# Patient Record
Sex: FEMALE | Race: OTHER | NOT HISPANIC OR LATINO | Employment: FULL TIME | URBAN - METROPOLITAN AREA
[De-identification: names, ages, dates, MRNs, and addresses within clinical notes are randomized per-mention and may not be internally consistent; named-entity substitution may affect disease eponyms.]

---

## 2017-07-24 DIAGNOSIS — M54.42 LUMBAGO WITH SCIATICA, LEFT SIDE: ICD-10-CM

## 2017-07-24 DIAGNOSIS — G56.00 CARPAL TUNNEL SYNDROME, UNSPECIFIED LATERALITY: ICD-10-CM

## 2017-07-24 DIAGNOSIS — R20.0 NUMBNESS: ICD-10-CM

## 2017-07-24 RX ORDER — GABAPENTIN 600 MG/1
TABLET ORAL
Qty: 90 TABLET | Refills: 2 | Status: SHIPPED | OUTPATIENT
Start: 2017-07-24 | End: 2018-04-04

## 2017-07-25 NOTE — TELEPHONE ENCOUNTER
Left a message for the patient to return my call to reschedule her appointment with another provider.

## 2017-08-04 ENCOUNTER — TELEPHONE (OUTPATIENT)
Dept: NEUROLOGY | Facility: CLINIC | Age: 57
End: 2017-08-04

## 2017-08-04 NOTE — TELEPHONE ENCOUNTER
----- Message from Maggie Layton sent at 7/28/2017  8:50 AM CDT -----  Contact: Self @ [phone]   Pt is returning call from Caro Duarte. Pls call.

## 2017-08-28 ENCOUNTER — OFFICE VISIT (OUTPATIENT)
Dept: NEUROLOGY | Facility: CLINIC | Age: 57
End: 2017-08-28
Payer: COMMERCIAL

## 2017-08-28 VITALS
SYSTOLIC BLOOD PRESSURE: 132 MMHG | BODY MASS INDEX: 41.99 KG/M2 | DIASTOLIC BLOOD PRESSURE: 89 MMHG | HEIGHT: 63 IN | WEIGHT: 237 LBS | HEART RATE: 92 BPM

## 2017-08-28 DIAGNOSIS — G47.33 OSA (OBSTRUCTIVE SLEEP APNEA): Primary | ICD-10-CM

## 2017-08-28 DIAGNOSIS — E55.9 VITAMIN D DEFICIENCY: ICD-10-CM

## 2017-08-28 PROCEDURE — 3075F SYST BP GE 130 - 139MM HG: CPT | Mod: S$GLB,,, | Performed by: PSYCHIATRY & NEUROLOGY

## 2017-08-28 PROCEDURE — 99999 PR PBB SHADOW E&M-EST. PATIENT-LVL III: CPT | Mod: PBBFAC,,, | Performed by: PSYCHIATRY & NEUROLOGY

## 2017-08-28 PROCEDURE — 3079F DIAST BP 80-89 MM HG: CPT | Mod: S$GLB,,, | Performed by: PSYCHIATRY & NEUROLOGY

## 2017-08-28 PROCEDURE — 3008F BODY MASS INDEX DOCD: CPT | Mod: S$GLB,,, | Performed by: PSYCHIATRY & NEUROLOGY

## 2017-08-28 PROCEDURE — 99213 OFFICE O/P EST LOW 20 MIN: CPT | Mod: S$GLB,,, | Performed by: PSYCHIATRY & NEUROLOGY

## 2017-08-28 RX ORDER — VIT C/E/ZN/COPPR/LUTEIN/ZEAXAN 250MG-90MG
1000 CAPSULE ORAL DAILY
Qty: 90 CAPSULE | Refills: 3 | COMMUNITY
Start: 2017-08-28 | End: 2018-04-04

## 2017-08-28 RX ORDER — ZOLPIDEM TARTRATE 10 MG/1
10 TABLET ORAL
Refills: 3 | COMMUNITY
Start: 2017-07-24 | End: 2018-10-01 | Stop reason: SDUPTHER

## 2017-08-28 NOTE — PROGRESS NOTES
[facility] - NEUROLOGY  [facility] [place]    Date: August 28, 2017   Patient Name: Lulú Rosenthal   MRN: [id number]   PCP: Ran Interiano  Referring Provider: Ran Interiano MD    Assessment:      This is Lulú Rosenthal, 56 y.o. female with a history of craniotomy in the 1980's with intermittent paresthesias with preserved strength and no signs of myelopathy on exam.  She was advised to continue B-complex vitamin and begin supplementation with vitamin D.  She has symptoms concerning for sleep apnea which may related to some of her non-specific complaints.     Plan:      -  PSG  -  Vitamin supplemenation as above  -  Continue GBP  -  Follow up 3 months       I discussed side effects of the medications. I asked the patient to  stop the medication if she notices serious adverse effects as we discussed and to seek immediate medical attention at an ER.     Edgar Mattson MD  [facility]   Department of Neurology    Subjective:      HPI:   Ms. Lulú Rosenthal is a 56 y.o. female who presents with a chief complaint of paresthesias    She was initially evaluated for this issue by Dr. Hannon 11/2016, she began having intermittent left arm and leg weakness earlier that year.  She has been noted to have low vitamin B1 and D levels and briefly supplemented with B-complex but felt she had too much energy with this and has since stopped.  She continues to have intermittent left arm and leg tingling not alleviated by GBP, occasional difficulty with dexterity such as buttons.  Past several months this has begun to happen on the right as well.    She notes significant trouble maintaining sleep, daytime somnolence, and snoring.    PAST MEDICAL HISTORY:  Past Medical History:   Diagnosis Date    CTS (carpal tunnel syndrome)     Hypertension        PAST SURGICAL HISTORY:  Past Surgical History:   Procedure Laterality Date    CEREBRAL ANEURYSM REPAIR      TUBAL LIGATION    "      CURRENT MEDS:  Current Outpatient Prescriptions   Medication Sig Dispense Refill    cetirizine (ZYRTEC) 10 MG tablet 10 mg.  5    gabapentin (NEURONTIN) 600 MG tablet TAKE 1/2 TABLET BY MOUTH EVERY 6 TO 8 HOURS AS NEEDED. MAY INCREASE TO 1 WHOLE TABLET AS NEEDED. 90 tablet 2    hydrochlorothiazide (HYDRODIURIL) 25 MG tablet Take 25 mg by mouth once daily.      loratadine (CLARITIN) 10 mg tablet Take 10 mg by mouth once daily.      montelukast (SINGULAIR) 10 mg tablet Take 10 mg by mouth every evening.  3    naproxen (NAPROSYN) 500 MG tablet Take 1 tablet (500 mg total) by mouth 2 (two) times daily with meals. 30 tablet 0    zolpidem (AMBIEN) 10 mg Tab 10 mg.  3     No current facility-administered medications for this visit.        ALLERGIES:  Review of patient's allergies indicates:  No Known Allergies    FAMILY HISTORY:  Family History   Problem Relation Age of Onset    Heart disease Mother     Hypertension Mother     Diabetes Father     Heart disease Father     Psoriasis Neg Hx     Eczema Neg Hx        SOCIAL HISTORY:  Social History   Substance Use Topics    Smoking status: Former Smoker     Packs/day: 0.80     Types: Cigarettes     Quit date: 3/6/2016    Smokeless tobacco: Never Used    Alcohol use 1.8 oz/week     3 Glasses of wine per week      Comment: occasionally       Review of Systems:  12 review of systems is negative except for the symptoms mentioned in HPI.        Objective:     Vitals:    08/28/17 1302   BP: 132/89   Pulse: 92   Weight: 107.5 kg (236 lb 15.9 oz)   Height: 5' 3" (1.6 m)       General: NAD, well nourished   Eyes: no tearing, discharge, no erythema   ENT: moist mucous membranes of the oral cavity, nares patent    Neck: Supple, full range of motion  Cardiovascular: Warm and well perfused, pulses equal and symmetrical  Lungs: Normal work of breathing, normal chest wall excursions  Skin: No rash, lesions, or breakdown on exposed skin  Psychiatry: Mood and affect are " appropriate   Abdomen: soft, non tender, non distended  Extremeties: No cyanosis, clubbing or edema.    Neurological   MENTAL STATUS: Alert and oriented to person, place, and time. Attention and concentration within normal limits. Speech without dysarthria, able to name and repeat without difficulty. Recent and remote memory within normal limits   CRANIAL NERVES: Visual fields intact. PERRL. EOMI. Facial sensation intact. Face symmetrical. Hearing grossly intact. Full shoulder shrug bilaterally. Tongue protrudes midline   SENSORY: Sensation is intact to light touch throughout.  Negative Romberg.   MOTOR: Normal bulk and tone. No pronator drift.  5/5 deltoid, biceps, triceps, interosseous, hand  bilaterally. 5/5 iliopsoas, knee extension/flexion, foot dorsi/plantarflexion bilaterally.    REFLEXES: ankles mute, remainder symmetric and 2+ throughout.   CEREBELLAR/COORDINATION/GAIT: Gait steady with normal arm swing and stride length.  Heel to shin intact. Finger to nose intact. Normal rapid alternating movements.

## 2017-08-28 NOTE — LETTER
August 28, 2017      Ran Interiano MD  [address] [facility]  [address]  [facility] [address]           [facility]  [address]  [place] [address]  Phone: [phone]  Fax: [phone]          Patient: Lulú Rosenthal   MR Number: [id number]   YOB: 1960   Date of Visit: 8/28/2017       Dear Dr. Ran Interiano:    Thank you for referring Lulú Rosenthal to me for evaluation. Attached you will find relevant portions of my assessment and plan of care.    If you have questions, please do not hesitate to call me. I look forward to following Lulú Rosenthal along with you.    Sincerely,    Edgar Mattson MD    Enclosure  CC:  No Recipients    If you would like to receive this communication electronically, please contact [email or web address] or [phone] to request more information on [employer] Link access.    For providers and/or their staff who would like to refer a patient to [facility], please contact us through our one-stop-shop provider referral line, [facility], at [phone].    If you feel you have received this communication in error or would no longer like to receive these types of communications, please e-mail [email or web address]

## 2017-10-24 ENCOUNTER — HOSPITAL ENCOUNTER (OUTPATIENT)
Dept: RADIOLOGY | Facility: HOSPITAL | Age: 57
Discharge: HOME | End: 2017-10-24
Attending: INTERNAL MEDICINE
Payer: COMMERCIAL

## 2017-10-24 VITALS — HEIGHT: 63 IN | WEIGHT: 236 LBS | BODY MASS INDEX: 41.82 KG/M2

## 2017-10-24 PROCEDURE — 77067 SCR MAMMO BI INCL CAD: CPT | Mod: TC

## 2017-10-24 PROCEDURE — 77067 SCR MAMMO BI INCL CAD: CPT | Mod: 26,,, | Performed by: RADIOLOGY

## 2017-11-03 ENCOUNTER — TELEPHONE (OUTPATIENT)
Dept: SLEEP MEDICINE | Facility: CLINIC | Age: 57
End: 2017-11-03

## 2017-11-03 NOTE — TELEPHONE ENCOUNTER
I called the pt to offer a 8 am appt since she just missed her 7 am appt, she sounded angry and said that no one told her where to go.  She had driven to the main campus and now already at work.  She said she cannot take the later appt and will probably do it next year.    I advised pt sleep apnea is a serious illness if stay untreated, it will arise other issue in the body.    Pt agreed, but still wants to maybe do this next year...

## 2018-04-04 ENCOUNTER — OFFICE VISIT (OUTPATIENT)
Dept: FAMILY MEDICINE | Facility: CLINIC | Age: 58
End: 2018-04-04
Payer: COMMERCIAL

## 2018-04-04 ENCOUNTER — HOSPITAL ENCOUNTER (OUTPATIENT)
Dept: RADIOLOGY | Facility: HOSPITAL | Age: 58
Discharge: HOME | End: 2018-04-04
Attending: FAMILY MEDICINE
Payer: COMMERCIAL

## 2018-04-04 VITALS
WEIGHT: 233.69 LBS | TEMPERATURE: 98 F | OXYGEN SATURATION: 97 % | HEIGHT: 63 IN | SYSTOLIC BLOOD PRESSURE: 134 MMHG | BODY MASS INDEX: 41.41 KG/M2 | DIASTOLIC BLOOD PRESSURE: 78 MMHG | HEART RATE: 83 BPM | RESPIRATION RATE: 20 BRPM

## 2018-04-04 DIAGNOSIS — Z23 NEED FOR TETANUS BOOSTER: ICD-10-CM

## 2018-04-04 DIAGNOSIS — R20.0 LEFT ARM NUMBNESS: ICD-10-CM

## 2018-04-04 DIAGNOSIS — Z23 NEED FOR VACCINATION: ICD-10-CM

## 2018-04-04 DIAGNOSIS — G56.00 CARPAL TUNNEL SYNDROME, UNSPECIFIED LATERALITY: ICD-10-CM

## 2018-04-04 DIAGNOSIS — Z00.00 ROUTINE GENERAL MEDICAL EXAMINATION AT A HEALTH CARE FACILITY: Primary | ICD-10-CM

## 2018-04-04 DIAGNOSIS — M54.5 CHRONIC MIDLINE LOW BACK PAIN, WITH SCIATICA PRESENCE UNSPECIFIED: ICD-10-CM

## 2018-04-04 DIAGNOSIS — G89.29 CHRONIC MIDLINE LOW BACK PAIN, WITH SCIATICA PRESENCE UNSPECIFIED: ICD-10-CM

## 2018-04-04 DIAGNOSIS — Z11.59 NEED FOR HEPATITIS C SCREENING TEST: ICD-10-CM

## 2018-04-04 DIAGNOSIS — I10 HYPERTENSION, UNSPECIFIED TYPE: ICD-10-CM

## 2018-04-04 PROCEDURE — 72100 X-RAY EXAM L-S SPINE 2/3 VWS: CPT | Mod: TC,FY,PO

## 2018-04-04 PROCEDURE — 72100 X-RAY EXAM L-S SPINE 2/3 VWS: CPT | Mod: 26,,, | Performed by: RADIOLOGY

## 2018-04-04 PROCEDURE — 72040 X-RAY EXAM NECK SPINE 2-3 VW: CPT | Mod: TC,FY,PO

## 2018-04-04 PROCEDURE — 99999 PR PBB SHADOW E&M-EST. PATIENT-LVL V: CPT | Mod: PBBFAC,,, | Performed by: FAMILY MEDICINE

## 2018-04-04 PROCEDURE — 99396 PREV VISIT EST AGE 40-64: CPT | Mod: 25,S$GLB,, | Performed by: FAMILY MEDICINE

## 2018-04-04 PROCEDURE — 72040 X-RAY EXAM NECK SPINE 2-3 VW: CPT | Mod: 26,,, | Performed by: RADIOLOGY

## 2018-04-04 PROCEDURE — 90471 IMMUNIZATION ADMIN: CPT | Mod: 59,S$GLB,, | Performed by: FAMILY MEDICINE

## 2018-04-04 PROCEDURE — 90715 TDAP VACCINE 7 YRS/> IM: CPT | Mod: S$GLB,,, | Performed by: FAMILY MEDICINE

## 2018-04-04 PROCEDURE — 96372 THER/PROPH/DIAG INJ SC/IM: CPT | Mod: S$GLB,,, | Performed by: FAMILY MEDICINE

## 2018-04-04 RX ORDER — TIZANIDINE 4 MG/1
TABLET ORAL
COMMUNITY
Start: 2018-04-03 | End: 2018-04-04 | Stop reason: SDUPTHER

## 2018-04-04 RX ORDER — FLUTICASONE PROPIONATE 50 MCG
SPRAY, SUSPENSION (ML) NASAL
COMMUNITY
Start: 2018-02-16 | End: 2018-04-04

## 2018-04-04 RX ORDER — TRIAMCINOLONE ACETONIDE 40 MG/ML
40 INJECTION, SUSPENSION INTRA-ARTICULAR; INTRAMUSCULAR
Status: COMPLETED | OUTPATIENT
Start: 2018-04-04 | End: 2018-04-04

## 2018-04-04 RX ORDER — LOSARTAN POTASSIUM 50 MG/1
50 TABLET ORAL DAILY
Qty: 30 TABLET | Refills: 3 | Status: SHIPPED | OUTPATIENT
Start: 2018-04-04 | End: 2018-07-19

## 2018-04-04 RX ORDER — TIZANIDINE 4 MG/1
4 TABLET ORAL 2 TIMES DAILY PRN
Qty: 60 TABLET | Refills: 1 | Status: SHIPPED | OUTPATIENT
Start: 2018-04-04 | End: 2018-10-01 | Stop reason: SDUPTHER

## 2018-04-04 RX ADMIN — TRIAMCINOLONE ACETONIDE 40 MG: 40 INJECTION, SUSPENSION INTRA-ARTICULAR; INTRAMUSCULAR at 03:04

## 2018-04-04 NOTE — PROGRESS NOTES
Chief Complaint   Patient presents with    [facility]       HPI  Lulú Rosenthal is a 57 y.o. female with multiple medical diagnoses as listed in the medical history and problem list that presents to establish care.    Back pain - 2 year hx, lower back pain, exercising at time and myalgia started, improved for few months, intermittent returns, worse with walking up steps, flat shoes worsens. Pain radiates laterally, dec ROM.    Muscle cramps/spasms - intermittent, worse with HCTZ use.     HTN - overall doing well, although states cramping associated with HCTZ.      Pt is known to me and was last seen by me on Visit date not found.    PAST MEDICAL HISTORY:  Past Medical History:   Diagnosis Date    CTS (carpal tunnel syndrome)     Hypertension        PAST SURGICAL HISTORY:  Past Surgical History:   Procedure Laterality Date    BREAST BIOPSY Left 2013    Core bx, benign    CEREBRAL ANEURYSM REPAIR      TUBAL LIGATION         SOCIAL HISTORY:  Social History     Social History    Marital status: Single     Spouse name: N/A    Number of children: N/A    Years of education: N/A     Occupational History     [employer]     Social History Main Topics    Smoking status: Former Smoker     Packs/day: 0.80     Types: Cigarettes     Quit date: 3/6/2016    Smokeless tobacco: Never Used    Alcohol use 1.8 oz/week     3 Glasses of wine per week      Comment: occasionally    Drug use: No    Sexual activity: Not on file     Other Topics Concern    Not on file     Social History Narrative    No narrative on file       FAMILY HISTORY:  Family History   Problem Relation Age of Onset    Heart disease Mother     Hypertension Mother     Diabetes Father     Heart disease Father     Psoriasis Neg Hx     Eczema Neg Hx        ALLERGIES AND MEDICATIONS: updated and reviewed.  Review of patient's allergies indicates:   Allergen Reactions    Flonase [fluticasone] Swelling and Other (See Comments)      "Nasal Swelling     Current Outpatient Prescriptions   Medication Sig Dispense Refill    cetirizine (ZYRTEC) 10 MG tablet 10 mg.  5    loratadine (CLARITIN) 10 mg tablet Take 10 mg by mouth once daily.      montelukast (SINGULAIR) 10 mg tablet Take 10 mg by mouth every evening.  3    tiZANidine (ZANAFLEX) 4 MG tablet Take 1 tablet (4 mg total) by mouth 2 (two) times daily as needed. 60 tablet 1    zolpidem (AMBIEN) 10 mg Tab 10 mg.  3    losartan (COZAAR) 50 MG tablet Take 1 tablet (50 mg total) by mouth once daily. 30 tablet 3     No current facility-administered medications for this visit.        ROS  Review of Systems   Constitutional: Negative for activity change, appetite change and fever.   HENT: Negative for congestion and sore throat.    Eyes: Negative for visual disturbance.   Respiratory: Negative for cough and shortness of breath.    Cardiovascular: Negative for chest pain.   Gastrointestinal: Negative for abdominal pain, diarrhea, nausea and vomiting.   Endocrine: Negative.    Genitourinary: Negative for dysuria.   Musculoskeletal: Positive for arthralgias and back pain.   Skin: Negative for rash.   Allergic/Immunologic: Negative.    Neurological: Negative for dizziness, weakness and headaches.   Hematological: Negative.    Psychiatric/Behavioral: Negative for agitation and confusion.       Physical Exam  Vitals:    04/04/18 1300   BP: 134/78   Pulse: 83   Resp: 20   Temp: 98.1 °F (36.7 °C)    Body mass index is 41.4 kg/m².  Weight: 106 kg (233 lb 11 oz)   Height: 5' 3" (160 cm)     Physical Exam   Constitutional: She is oriented to person, place, and time. She appears well-developed and well-nourished.   HENT:   Head: Normocephalic.   Neurological: She is alert and oriented to person, place, and time.   Psychiatric: She has a normal mood and affect. Her behavior is normal. Judgment and thought content normal.       Health Maintenance       Date Due Completion Date    Hepatitis C Screening " 1960 ---    TETANUS VACCINE 10/24/1979 ---    Pap Smear with HPV Cotest 03/23/2019 3/23/2016    Mammogram 10/24/2019 10/24/2017    Lipid Panel 03/09/2021 3/9/2016    Colonoscopy 01/22/2023 1/22/2013          Assessment & Plan    Routine general medical examination at a health care facility  -     CBC auto differential; Future; Expected date: 04/04/2018  -     Comprehensive metabolic panel; Future; Expected date: 04/04/2018  -     Lipid panel; Future; Expected date: 04/04/2018  -     TSH; Future; Expected date: 04/04/2018  -     T4, free; Future; Expected date: 04/04/2018  -     Hemoglobin A1c; Future; Expected date: 04/04/2018  - Assess labs and imaging today    Need for vaccination  -     Tdap Vaccine    Need for hepatitis C screening test  -     Hepatitis C antibody; Future; Expected date: 04/04/2018    Chronic midline low back pain, with sciatica presence unspecified  -     X-Ray Lumbar Spine AP And Lateral; Future; Expected date: 04/04/2018  - Reviewed images with patient, L5/S1 joint space narrowing, moderate DDD     Left arm numbness  -     Ambulatory referral to Neurology  -     RAMONE; Future; Expected date: 04/04/2018  -     X-Ray Cervical Spine AP And Lateral; Future; Expected date: 04/04/2018  - Assess imaging and labs today    Carpal tunnel syndrome, unspecified laterality  -     Ambulatory referral to Orthopedics        Follow-up in about 4 weeks (around 5/2/2018), or if symptoms worsen or fail to improve.

## 2018-04-04 NOTE — PROGRESS NOTES
Patient given kenalog 40 mg injection right deltoid and Tdap vaccine left deltoid, tolerated well, no complaints, no reaction noted

## 2018-04-05 ENCOUNTER — LAB VISIT (OUTPATIENT)
Dept: LAB | Facility: HOSPITAL | Age: 58
End: 2018-04-05
Attending: FAMILY MEDICINE
Payer: COMMERCIAL

## 2018-04-05 DIAGNOSIS — Z11.59 NEED FOR HEPATITIS C SCREENING TEST: ICD-10-CM

## 2018-04-05 DIAGNOSIS — Z00.00 ROUTINE GENERAL MEDICAL EXAMINATION AT A HEALTH CARE FACILITY: ICD-10-CM

## 2018-04-05 DIAGNOSIS — R20.0 LEFT ARM NUMBNESS: ICD-10-CM

## 2018-04-05 LAB
ALBUMIN SERPL BCP-MCNC: 3.8 G/DL
ALP SERPL-CCNC: 106 U/L
ALT SERPL W/O P-5'-P-CCNC: 21 U/L
ANION GAP SERPL CALC-SCNC: 8 MMOL/L
AST SERPL-CCNC: 18 U/L
BASOPHILS # BLD AUTO: 0.02 K/UL
BASOPHILS NFR BLD: 0.3 %
BILIRUB SERPL-MCNC: 0.3 MG/DL
BUN SERPL-MCNC: 21 MG/DL
CALCIUM SERPL-MCNC: 9.4 MG/DL
CHLORIDE SERPL-SCNC: 106 MMOL/L
CHOLEST SERPL-MCNC: 163 MG/DL
CHOLEST/HDLC SERPL: 3.1 {RATIO}
CO2 SERPL-SCNC: 26 MMOL/L
CREAT SERPL-MCNC: 0.8 MG/DL
DIFFERENTIAL METHOD: ABNORMAL
EOSINOPHIL # BLD AUTO: 0 K/UL
EOSINOPHIL NFR BLD: 0.4 %
ERYTHROCYTE [DISTWIDTH] IN BLOOD BY AUTOMATED COUNT: 13.5 %
EST. GFR  (AFRICAN AMERICAN): >60 ML/MIN/1.73 M^2
EST. GFR  (NON AFRICAN AMERICAN): >60 ML/MIN/1.73 M^2
ESTIMATED AVG GLUCOSE: 108 MG/DL
GLUCOSE SERPL-MCNC: 104 MG/DL
HBA1C MFR BLD HPLC: 5.4 %
HCT VFR BLD AUTO: 40.4 %
HCV AB SERPL QL IA: NEGATIVE
HDLC SERPL-MCNC: 52 MG/DL
HDLC SERPL: 31.9 %
HGB BLD-MCNC: 12.9 G/DL
IMM GRANULOCYTES # BLD AUTO: 0.01 K/UL
IMM GRANULOCYTES NFR BLD AUTO: 0.1 %
LDLC SERPL CALC-MCNC: 102.6 MG/DL
LYMPHOCYTES # BLD AUTO: 1.5 K/UL
LYMPHOCYTES NFR BLD: 20.6 %
MCH RBC QN AUTO: 30.7 PG
MCHC RBC AUTO-ENTMCNC: 31.9 G/DL
MCV RBC AUTO: 96 FL
MONOCYTES # BLD AUTO: 0.4 K/UL
MONOCYTES NFR BLD: 6.2 %
NEUTROPHILS # BLD AUTO: 5.1 K/UL
NEUTROPHILS NFR BLD: 72.4 %
NONHDLC SERPL-MCNC: 111 MG/DL
NRBC BLD-RTO: 0 /100 WBC
PLATELET # BLD AUTO: 244 K/UL
PMV BLD AUTO: 11.2 FL
POTASSIUM SERPL-SCNC: 4.1 MMOL/L
PROT SERPL-MCNC: 7.8 G/DL
RBC # BLD AUTO: 4.2 M/UL
SODIUM SERPL-SCNC: 140 MMOL/L
T4 FREE SERPL-MCNC: 0.86 NG/DL
TRIGL SERPL-MCNC: 42 MG/DL
TSH SERPL DL<=0.005 MIU/L-ACNC: 0.83 UIU/ML
WBC # BLD AUTO: 7.08 K/UL

## 2018-04-05 PROCEDURE — 84443 ASSAY THYROID STIM HORMONE: CPT

## 2018-04-05 PROCEDURE — 86038 ANTINUCLEAR ANTIBODIES: CPT

## 2018-04-05 PROCEDURE — 36415 COLL VENOUS BLD VENIPUNCTURE: CPT

## 2018-04-05 PROCEDURE — 83036 HEMOGLOBIN GLYCOSYLATED A1C: CPT

## 2018-04-05 PROCEDURE — 86803 HEPATITIS C AB TEST: CPT

## 2018-04-05 PROCEDURE — 80061 LIPID PANEL: CPT

## 2018-04-05 PROCEDURE — 80053 COMPREHEN METABOLIC PANEL: CPT

## 2018-04-05 PROCEDURE — 85025 COMPLETE CBC W/AUTO DIFF WBC: CPT

## 2018-04-05 PROCEDURE — 84439 ASSAY OF FREE THYROXINE: CPT

## 2018-04-06 LAB — ANA SER QL IF: NORMAL

## 2018-04-10 ENCOUNTER — TELEPHONE (OUTPATIENT)
Dept: ADMINISTRATIVE | Facility: HOSPITAL | Age: 58
End: 2018-04-10

## 2018-04-12 NOTE — PROGRESS NOTES
Subjective:      Patient ID: Lulú Rosenthal is a 57 y.o. female.    Chief Complaint: Low-back Pain    Referred by: Hemant Sommer MD     Ms Rosenthal is a 56 yo female here for evaluation of low back pain. She has had the low back pain for about 2 years.  The pain is in the lower back and goes to both sides.  The pain is worst with walking, and getting up from sitting.  She has to stand for a period and then get going.  The pain is also getting out of bed.  The pain is in the lower back.  It does not radiate.  She has numbness in the left side of her body.  The numbness has been going on for 3 years.  The pain is all words in the lower back.  The back pain has been worsening.  The pain is 5/10 now, worst 8/10 getting up from chair, best 4/10 after shot helped for a week.  She has not tried tizanidine.,  She has not gotten it yet.  She had steroid shot and that did help.  She has not done PT for her back.  She has not been chiropractor.  She has not had any treatment.  She feels like she is better since injections.  Without the steroid shot, she will sometimes have to use crutches to get around.  She feels like back is spasming and feet.      X-ray cervical 4/4/2018  There is mild DJD between C3 and T1.  The odontoid, prevertebral soft tissues, and posterior elements are intact.  No fracture dislocation bone destruction seen.  Impression       See above    DJD.    X-ray lumbar 4/4/2018  There is grade 1 anterolisthesis of L5 on S1 with moderate DJD at that level.  There is mild DJD at remaining lumbar and lower thoracic levels.  No fracture dislocation bone destruction seen.    Past Medical History:  No date: CTS (carpal tunnel syndrome)  No date: Hypertension    Past Surgical History:  2013: BREAST BIOPSY Left      Comment: Core bx, benign  No date: CEREBRAL ANEURYSM REPAIR  No date: TUBAL LIGATION    Review of patient's family history indicates:    Heart disease                  Mother                     Hypertension                   Mother                    Diabetes                       Father                    Heart disease                  Father                    Psoriasis                      Neg Hx                    Eczema                         Neg Hx                      Social History    Marital status: Single              Spouse name:                       Years of education:                 Number of children:               Occupational History  Occupation          Employer            Comment                                   [employer]     Social History Main Topics    Smoking status: Former Smoker                                                                Packs/day: 0.80      Years: 0.00           Types: Cigarettes       Quit date: 3/6/2016    Smokeless tobacco: Never Used                        Alcohol use: Yes           1.8 oz/week       Glasses of wine: 3 per week       Comment: occasionally    Drug use: No                Current Outpatient Prescriptions:  cetirizine (ZYRTEC) 10 MG tablet, 10 mg., Disp: , Rfl: 5  loratadine (CLARITIN) 10 mg tablet, Take 10 mg by mouth once daily., Disp: , Rfl:   losartan (COZAAR) 50 MG tablet, Take 1 tablet (50 mg total) by mouth once daily., Disp: 30 tablet, Rfl: 3  montelukast (SINGULAIR) 10 mg tablet, Take 10 mg by mouth every evening., Disp: , Rfl: 3  tiZANidine (ZANAFLEX) 4 MG tablet, Take 1 tablet (4 mg total) by mouth 2 (two) times daily as needed., Disp: 60 tablet, Rfl: 1  zolpidem (AMBIEN) 10 mg Tab, 10 mg., Disp: , Rfl: 3    No current facility-administered medications for this visit.       Review of patient's allergies indicates:   -- Flonase (fluticasone) -- Swelling and Other (See Comments)    --  Nasal Swelling                Review of Systems   Constitution: Negative for weight gain and weight loss.   Cardiovascular: Negative for chest pain.   Respiratory: Negative for shortness of breath.    Musculoskeletal: Positive for back pain  and muscle cramps. Negative for joint pain and joint swelling.   Gastrointestinal: Negative for abdominal pain and bowel incontinence.   Genitourinary: Negative for bladder incontinence.   Neurological: Positive for paresthesias (left arm and leg are tingling, occ right). Negative for numbness.           Objective:          General    Vitals reviewed.  Constitutional: She is oriented to person, place, and time. She appears well-developed and well-nourished.   HENT:   Head: Normocephalic and atraumatic.   Pulmonary/Chest: Effort normal.   Neurological: She is alert and oriented to person, place, and time.   Psychiatric: She has a normal mood and affect. Her behavior is normal. Judgment and thought content normal.     General Musculoskeletal Exam   Gait: normal     Right Ankle/Foot Exam     Tests   Heel Walk: able to perform  Tiptoe Walk: able to perform    Left Ankle/Foot Exam     Tests   Heel Walk: able to perform  Tiptoe Walk: able to perform  Back (L-Spine & T-Spine) / Neck (C-Spine) Exam     Tenderness Left paramedian tenderness of the Sacrum.     Back (L-Spine & T-Spine) Range of Motion   Extension: 20   Flexion: 90   Lateral Bend Right: 20   Lateral Bend Left: 20   Rotation Right: 40   Rotation Left: 40     Spinal Sensation   Right Side Sensation  C-Spine Level: normal   L-Spine Level: normal  S-Spine Level: normal  Left Side Sensation  C-Spine Level: normal  L-Spine Level: normal  S-Spine Level: normal    Back (L-Spine & T-Spine) Tests   Right Side Tests  Straight leg raise:      Sitting SLR: > 70 degrees      Left Side Tests  Straight leg raise:     Sitting SLR: > 70 degrees          Other She has no scoliosis .  Spinal Kyphosis:  Absent      Muscle Strength   Right Upper Extremity   Biceps: 5/5/5   Deltoid:  5/5  Triceps:  5/5  Wrist Extension: 5/5/5   Finger Flexors:  5/5  Left Upper Extremity  Biceps: 5/5/5   Deltoid:  5/5  Triceps:  5/5  Wrist Extension: 5/5/5   Finger Flexors:  5/5  Right Lower Extremity    Hip Flexion: 5/5   Quadriceps:  5/5   Anterior tibial:  5/5/5  EHL:  5/5  Left Lower Extremity   Hip Flexion: 5/5   Quadriceps:  5/5   Anterior tibial:  5/5/5   EHL:  5/5    Reflexes     Left Side  Biceps:  2+  Triceps:  2+  Brachioradialis:  2+  Quadriceps:  2+  Achilles:  2+  Left Jane's Sign:  Absent  Babinski Sign:  absent    Right Side   Biceps:  2+  Triceps:  2+  Brachioradialis:  2+  Quadriceps:  2+  Achilles:  2+  Right Jane's Sign:  absent  Babinski Sign:  absent    Vascular Exam     Right Pulses        Carotid:                  2+    Left Pulses        Carotid:                  2+        Assessment:       Encounter Diagnoses   Name Primary?    Spondylosis of lumbar region without myelopathy or radiculopathy Yes    Chronic midline low back pain without sciatica     Neuralgia          Plan:       Lulú was seen today for low-back pain.    Diagnoses and all orders for this visit:    Spondylosis of lumbar region without myelopathy or radiculopathy  -     Ambulatory Referral to Physical/Occupational Therapy    Chronic midline low back pain without sciatica  -     Ambulatory Referral to Physical/Occupational Therapy    Neuralgia  -     EMG W/ ULTRASOUND AND NERVE CONDUCTION TEST 2 Extremities; Future    Other orders  -     naproxen sodium (ANAPROX) 550 MG tablet; Take 1 tablet (550 mg total) by mouth 2 (two) times daily with meals.         More than 50% of the total time of 45 minutes was spent in counseling on diagnosis and treatment options. We discussed back pain and the nature of back pain.  We discussed that it is not one thing that causes the pain but an accumulation of multiple things that we do.  We discussed posture sitting and the importance of trying to sit better.  We discussed the benefits of therapy and exercise and continuing to move.  1.  Tizanidine 4mg po BID, she has not started it  2.  PT for back strengthening, core strengthening, Si joint mobilization and HEP She is going to look  for location that fits her schedule  3.  Naproxen 550mg po BID  4.  EMG left UE and LE tingling that comes and goes (no change in reflexes will wait on MRI cervical for now)  5.  WE discussed the importance of continuing activity.  She feels like she use to be more active until 2 years ago when lost her fiance   6.  She had an IM steroid shot last week, with relief.  She might need SI joint injection in future  7.  RTC 3 months

## 2018-04-13 ENCOUNTER — OFFICE VISIT (OUTPATIENT)
Dept: SPINE | Facility: CLINIC | Age: 58
End: 2018-04-13
Attending: PHYSICAL MEDICINE & REHABILITATION
Payer: COMMERCIAL

## 2018-04-13 VITALS
WEIGHT: 230 LBS | SYSTOLIC BLOOD PRESSURE: 163 MMHG | HEIGHT: 63 IN | BODY MASS INDEX: 40.75 KG/M2 | HEART RATE: 80 BPM | DIASTOLIC BLOOD PRESSURE: 84 MMHG

## 2018-04-13 DIAGNOSIS — M54.50 CHRONIC MIDLINE LOW BACK PAIN WITHOUT SCIATICA: ICD-10-CM

## 2018-04-13 DIAGNOSIS — M47.816 SPONDYLOSIS OF LUMBAR REGION WITHOUT MYELOPATHY OR RADICULOPATHY: Primary | ICD-10-CM

## 2018-04-13 DIAGNOSIS — M79.2 NEURALGIA: ICD-10-CM

## 2018-04-13 DIAGNOSIS — G89.29 CHRONIC MIDLINE LOW BACK PAIN WITHOUT SCIATICA: ICD-10-CM

## 2018-04-13 PROCEDURE — 99999 PR PBB SHADOW E&M-EST. PATIENT-LVL III: CPT | Mod: PBBFAC,,, | Performed by: PHYSICAL MEDICINE & REHABILITATION

## 2018-04-13 PROCEDURE — 99204 OFFICE O/P NEW MOD 45 MIN: CPT | Mod: S$GLB,,, | Performed by: PHYSICAL MEDICINE & REHABILITATION

## 2018-04-13 RX ORDER — NAPROXEN 500 MG/1
TABLET ORAL
COMMUNITY
Start: 2018-02-16 | End: 2018-07-19

## 2018-04-13 RX ORDER — EPINEPHRINE 0.3 MG/.3ML
INJECTION SUBCUTANEOUS
COMMUNITY
Start: 2016-04-11 | End: 2018-07-19

## 2018-04-13 RX ORDER — BEPOTASTINE BESILATE 15 MG/ML
SOLUTION/ DROPS OPHTHALMIC
COMMUNITY
End: 2018-10-01 | Stop reason: SDUPTHER

## 2018-04-13 RX ORDER — NAPROXEN SODIUM 550 MG/1
550 TABLET ORAL 2 TIMES DAILY WITH MEALS
Qty: 60 TABLET | Refills: 2 | Status: SHIPPED | OUTPATIENT
Start: 2018-04-13 | End: 2018-07-10 | Stop reason: SDUPTHER

## 2018-04-13 RX ORDER — HYDROCHLOROTHIAZIDE 25 MG/1
TABLET ORAL
COMMUNITY
Start: 2018-03-13 | End: 2018-04-13

## 2018-04-13 RX ORDER — MONTELUKAST SODIUM 10 MG/1
TABLET ORAL
COMMUNITY
End: 2018-04-13

## 2018-04-13 RX ORDER — HYDROCHLOROTHIAZIDE 25 MG/1
TABLET ORAL
COMMUNITY
End: 2018-10-01 | Stop reason: SDUPTHER

## 2018-04-13 RX ORDER — ZOLPIDEM TARTRATE 10 MG/1
TABLET ORAL
COMMUNITY
End: 2018-04-13

## 2018-04-13 NOTE — LETTER
April 13, 2018      Hemant Sommer MD  [address] [place] [address]           [facility] - Spine Services  [address], [address]  [facility] [address]  Phone: [phone]  Fax: [phone]          Patient: Lulú Rosenthal   MR Number: [id number]   YOB: 1960   Date of Visit: 4/13/2018       Dear Dr. Hemant Sommer:    Thank you for referring Lulú Rosenthal to me for evaluation. Attached you will find relevant portions of my assessment and plan of care.    If you have questions, please do not hesitate to call me. I look forward to following Lulú Rosenthal along with you.    Sincerely,    Alka Meza MD    Enclosure  CC:  No Recipients    If you would like to receive this communication electronically, please contact [email or web address] or [phone] to request more information on [employer] Link access.    For providers and/or their staff who would like to refer a patient to [facility], please contact us through our one-stop-shop provider referral line, [facility], at [phone].    If you feel you have received this communication in error or would no longer like to receive these types of communications, please e-mail [email or web address]

## 2018-07-10 RX ORDER — NAPROXEN SODIUM 550 MG/1
550 TABLET ORAL 2 TIMES DAILY WITH MEALS
Qty: 60 TABLET | Refills: 2 | Status: SHIPPED | OUTPATIENT
Start: 2018-07-10 | End: 2018-10-01 | Stop reason: SDUPTHER

## 2018-07-19 ENCOUNTER — TELEPHONE (OUTPATIENT)
Dept: SPINE | Facility: CLINIC | Age: 58
End: 2018-07-19

## 2018-07-19 ENCOUNTER — PROCEDURE VISIT (OUTPATIENT)
Dept: NEUROLOGY | Facility: CLINIC | Age: 58
End: 2018-07-19
Attending: PHYSICAL MEDICINE & REHABILITATION
Payer: COMMERCIAL

## 2018-07-19 VITALS
HEIGHT: 63 IN | SYSTOLIC BLOOD PRESSURE: 149 MMHG | WEIGHT: 237.19 LBS | DIASTOLIC BLOOD PRESSURE: 80 MMHG | HEART RATE: 84 BPM | BODY MASS INDEX: 42.03 KG/M2

## 2018-07-19 DIAGNOSIS — M79.2 NEURALGIA: ICD-10-CM

## 2018-07-19 PROCEDURE — 99499 UNLISTED E&M SERVICE: CPT | Mod: GT,S$GLB,, | Performed by: PSYCHIATRY & NEUROLOGY

## 2018-07-19 NOTE — PROCEDURES
Procedures   Pt defers EMG/NCS testing of LUE and LLE at this time due to clinical improvement of symptoms and low severity of symptoms. Will reschedule PRN. She states she would like to explore non-painful/invasive options for diagnosis.

## 2018-07-19 NOTE — TELEPHONE ENCOUNTER
Staff contacted patient and spoke with her about her appt patient stated that she will cancel her appt for tomorrow because she did not have the EMG done. Patient stated that she was informed that the test was not warranted and she would like to speak with  regarding the EMG. Patient stated that she was doing fine with the physical therapy and she would like to discuss other treatment options that's not as invasive. Patient was asked if she still has numbness and tingling in her upper and lower extremities. She stated that she does but its not that severe. Patient was informed that her message would be forwarded to  and have  to contact her to discuss the concerns and questions she has regarding her recommendations

## 2018-07-20 NOTE — TELEPHONE ENCOUNTER
Called and left message fore her to return call.  She did not want to have EMG, she feels like tingling better.  We can get an MRI of the cervical spine to evaluate left sided numbness if needed

## 2018-07-24 ENCOUNTER — PATIENT MESSAGE (OUTPATIENT)
Dept: BARIATRICS | Facility: CLINIC | Age: 58
End: 2018-07-24

## 2018-07-24 ENCOUNTER — PATIENT MESSAGE (OUTPATIENT)
Dept: FAMILY MEDICINE | Facility: CLINIC | Age: 58
End: 2018-07-24

## 2018-07-24 DIAGNOSIS — E66.01 MORBID OBESITY: Primary | ICD-10-CM

## 2018-08-03 ENCOUNTER — NUTRITION (OUTPATIENT)
Dept: NUTRITION | Facility: CLINIC | Age: 58
End: 2018-08-03
Payer: COMMERCIAL

## 2018-08-03 VITALS — BODY MASS INDEX: 41.13 KG/M2 | WEIGHT: 232.13 LBS | HEIGHT: 63 IN

## 2018-08-03 DIAGNOSIS — E66.01 MORBID OBESITY WITH BMI OF 40.0-44.9, ADULT: Primary | ICD-10-CM

## 2018-08-03 DIAGNOSIS — Z71.3 DIETARY COUNSELING: ICD-10-CM

## 2018-08-03 PROCEDURE — 97802 MEDICAL NUTRITION INDIV IN: CPT | Mod: S$GLB,,, | Performed by: DIETITIAN, REGISTERED

## 2018-08-03 PROCEDURE — 99999 PR PBB SHADOW E&M-EST. PATIENT-LVL III: CPT | Mod: PBBFAC,,,

## 2018-08-03 NOTE — PROGRESS NOTES
"Referring Physician:Lisa Bernal PA-C     Reason for visit:  Chief Complaint   Patient presents with    Obesity    Nutrition Counseling        :1960     Allergies Reviewed  Meds Reviewed    Anthropometrics  Weight:105.3 kg (232 lb 2.3 oz)  Height:5' 3" (1.6 m)  BMI:Body mass index is 41.12 kg/m².   IBW:   52.3 kg    Meds:  Outpatient Medications Prior to Visit   Medication Sig Dispense Refill    bepotastine besilate (BEPREVE) 1.5 % Drop 1 drop into affected eye      cetirizine (ZYRTEC) 10 MG tablet 10 mg.  5    hydroCHLOROthiazide (HYDRODIURIL) 25 MG tablet 1 tablet      naproxen sodium (ANAPROX) 550 MG tablet Take 1 tablet (550 mg total) by mouth 2 (two) times daily with meals. 60 tablet 2    tetrahydroz-peg 400-hyprom-gly 0.05-1-0.36-0.2 % Drop 1 drop into affected eye as needed      tiZANidine (ZANAFLEX) 4 MG tablet Take 1 tablet (4 mg total) by mouth 2 (two) times daily as needed. 60 tablet 1    zolpidem (AMBIEN) 10 mg Tab 10 mg as needed.   3     No facility-administered medications prior to visit.          Labs:   Lipids wnl   HgbA1c 5.4     Estimated Nutrition Needs:   1569 Kcals/day( 30 kcal/kg IBW),    42 g protein( 0.8 g/kg IBW)     Diet Hx:   Pt does not exercise.  From diet history discussed today, pt eats fried chicken and fish most days.  Lives alone, and batch cooks, so she has leftovers for several days.  Drinks water; avoids sodas but has been drinking regular sports drinks.  Doesn't snack often; when she does, prefers salty to sweet.  Does not eat candy/sweets.    Breakfast:   Coffee with Equal and regular almond milk or Vanilla creamer.  May eat 2 eggs and simmons, or 1 egg with smoked sausage, or regular yogurt and unsalted crackers.  Lunch:   [employer] salad with fruit and pomegranate dressing or fried chicken.  Water.  Or leftovers.  Dinner:   Last night:  Fried fish with tartar sauce and lemon.  Water.  Every Friday for lunch goes to [employer] and has fried chicken leg " with piece of fried fish.    Assessment:   Pt attentive and asked relevant questions about foods recommended & to avoid; grocery shopping and cooking tips; portion sizes; sample meal plan and menus; sources of fats in current diet regimen and how to reduce fat intake; reading food labels; healthy snacks; importance of exercise/physical activity in weight management.  All questions answered, and pt verbalized understanding of information.  Encouraged pt to reduce intake of fried foods.    Nutrition Diagnosis:   Obesity RT excess energy intake and physical inactivity AEB Obesity - grade III, BMI >40    Recommendations: 1200 calorie, low fat, high fiber diet.  Avoid fried foods.  Exercise goal:  30 minutes per day, 3-5 days per week.  Handouts provided and reviewed:  Cardiac Nutrition Therapy; 1200 Calorie Sample 5-Day Menus; Weight Loss Tips; Cooking Tips for Weight Management; Get Fit Shopping List; Eat Fit Plan...Anytime/Anywhere;  Servings of Carbohydrates for Meal Planning; Walking Works; My Plate Planner;  Heart Healthy Eating:  Shopping Tips and Label Reading Tips        Consultation Time:35 minutes.    Follow Up:  Pt provided with dietitian contact number and advised to call with questions or make future appointment if further intervention needed.

## 2018-08-03 NOTE — PATIENT INSTRUCTIONS
1200 calorie, low fat, high fiber diet.  Avoid fried foods.  Exercise goal:  30 minutes per day, 3-5 days per week.

## 2018-08-21 ENCOUNTER — TELEPHONE (OUTPATIENT)
Dept: BARIATRICS | Facility: CLINIC | Age: 58
End: 2018-08-21

## 2018-10-01 ENCOUNTER — TELEPHONE (OUTPATIENT)
Dept: FAMILY MEDICINE | Facility: CLINIC | Age: 58
End: 2018-10-01

## 2018-10-01 DIAGNOSIS — I10 ESSENTIAL HYPERTENSION: Primary | ICD-10-CM

## 2018-10-01 DIAGNOSIS — M54.5 CHRONIC MIDLINE LOW BACK PAIN, WITH SCIATICA PRESENCE UNSPECIFIED: ICD-10-CM

## 2018-10-01 DIAGNOSIS — G89.29 CHRONIC MIDLINE LOW BACK PAIN, WITH SCIATICA PRESENCE UNSPECIFIED: ICD-10-CM

## 2018-10-01 DIAGNOSIS — J30.2 SEASONAL ALLERGIES: ICD-10-CM

## 2018-10-01 DIAGNOSIS — Z12.31 ENCOUNTER FOR SCREENING MAMMOGRAM FOR MALIGNANT NEOPLASM OF BREAST: ICD-10-CM

## 2018-10-01 DIAGNOSIS — G47.00 INSOMNIA, UNSPECIFIED TYPE: ICD-10-CM

## 2018-10-01 RX ORDER — ZOLPIDEM TARTRATE 10 MG/1
10 TABLET ORAL
Qty: 30 TABLET | Refills: 3 | Status: SHIPPED | OUTPATIENT
Start: 2018-10-01 | End: 2022-09-15 | Stop reason: SDUPTHER

## 2018-10-01 RX ORDER — BEPOTASTINE BESILATE 15 MG/ML
SOLUTION/ DROPS OPHTHALMIC
Qty: 10 ML | Refills: 2 | Status: SHIPPED | OUTPATIENT
Start: 2018-10-01 | End: 2019-05-14 | Stop reason: ALTCHOICE

## 2018-10-01 RX ORDER — CETIRIZINE HYDROCHLORIDE 10 MG/1
10 TABLET ORAL DAILY
Qty: 30 TABLET | Refills: 5 | Status: SHIPPED | OUTPATIENT
Start: 2018-10-01 | End: 2019-05-14 | Stop reason: ALTCHOICE

## 2018-10-01 RX ORDER — TIZANIDINE 4 MG/1
4 TABLET ORAL 2 TIMES DAILY PRN
Qty: 60 TABLET | Refills: 1 | Status: SHIPPED | OUTPATIENT
Start: 2018-10-01 | End: 2018-10-30 | Stop reason: SDUPTHER

## 2018-10-01 RX ORDER — NAPROXEN SODIUM 550 MG/1
550 TABLET ORAL 2 TIMES DAILY WITH MEALS
Qty: 60 TABLET | Refills: 2 | Status: SHIPPED | OUTPATIENT
Start: 2018-10-01 | End: 2019-01-03 | Stop reason: SDUPTHER

## 2018-10-01 RX ORDER — POTASSIUM CHLORIDE 20 MEQ/1
20 TABLET, EXTENDED RELEASE ORAL 2 TIMES DAILY
Qty: 30 TABLET | Refills: 0 | Status: SHIPPED | OUTPATIENT
Start: 2018-10-01 | End: 2023-06-16 | Stop reason: SDUPTHER

## 2018-10-01 RX ORDER — HYDROCHLOROTHIAZIDE 25 MG/1
TABLET ORAL
Qty: 30 TABLET | Refills: 0 | Status: SHIPPED | OUTPATIENT
Start: 2018-10-01 | End: 2018-10-30 | Stop reason: SDUPTHER

## 2018-10-01 NOTE — TELEPHONE ENCOUNTER
----- Message from Meena Ortiz sent at 10/1/2018  3:24 PM CDT -----  Contact: Self   Patient is asking to speak back with Ms.Donna. Please call at [phone].

## 2018-10-01 NOTE — TELEPHONE ENCOUNTER
Returned call to patient , patient unsure of medications and dosages. Patient will bring in medications to office to review for refills

## 2018-10-01 NOTE — TELEPHONE ENCOUNTER
----- Message from eMena Ortiz sent at 10/1/2018 12:30 PM CDT -----  Contact: Self   Patient is requesting MAMMO orders. Please call at [phone].

## 2018-10-01 NOTE — TELEPHONE ENCOUNTER
----- Message from Jacqui Calhoun sent at 10/1/2018 12:12 PM CDT -----  Contact: Pt can be reached at [phone]  Pt would like to schedule annual mammogram , pt also has numerous prescritpions that she would like to get filled. Pt would like call back.    Thanks !

## 2018-10-04 ENCOUNTER — CLINICAL SUPPORT (OUTPATIENT)
Dept: FAMILY MEDICINE | Facility: CLINIC | Age: 58
End: 2018-10-04
Payer: COMMERCIAL

## 2018-10-04 ENCOUNTER — OFFICE VISIT (OUTPATIENT)
Dept: OBSTETRICS AND GYNECOLOGY | Facility: CLINIC | Age: 58
End: 2018-10-04
Payer: COMMERCIAL

## 2018-10-04 VITALS
WEIGHT: 236 LBS | DIASTOLIC BLOOD PRESSURE: 80 MMHG | HEIGHT: 63 IN | BODY MASS INDEX: 41.82 KG/M2 | SYSTOLIC BLOOD PRESSURE: 137 MMHG

## 2018-10-04 DIAGNOSIS — Z23 FLU VACCINE NEED: Primary | ICD-10-CM

## 2018-10-04 DIAGNOSIS — Z01.419 WELL WOMAN EXAM WITH ROUTINE GYNECOLOGICAL EXAM: Primary | ICD-10-CM

## 2018-10-04 DIAGNOSIS — Z78.0 MENOPAUSE: ICD-10-CM

## 2018-10-04 PROCEDURE — 99999 PR PBB SHADOW E&M-EST. PATIENT-LVL III: CPT | Mod: PBBFAC,,, | Performed by: OBSTETRICS & GYNECOLOGY

## 2018-10-04 PROCEDURE — 90471 IMMUNIZATION ADMIN: CPT | Mod: S$GLB,,, | Performed by: FAMILY MEDICINE

## 2018-10-04 PROCEDURE — 99396 PREV VISIT EST AGE 40-64: CPT | Mod: S$GLB,,, | Performed by: OBSTETRICS & GYNECOLOGY

## 2018-10-04 PROCEDURE — 90686 IIV4 VACC NO PRSV 0.5 ML IM: CPT | Mod: S$GLB,,, | Performed by: FAMILY MEDICINE

## 2018-10-04 RX ORDER — LOSARTAN POTASSIUM 50 MG/1
TABLET ORAL
COMMUNITY
Start: 2018-09-30 | End: 2018-10-30 | Stop reason: SDUPTHER

## 2018-10-04 NOTE — PROGRESS NOTES
"[facility]  Ambulatory Clinic  Obstetrics & Gynecology    Visit Date:  10/4/2018    Chief Complaint:  Annual GYN exam    History of Present Illness:      Lulú Rosenthal is a 57 y.o.  here for a gynecologic exam.    Pt has no major complaints today.    Pt reports an uneventful transition into menopause with mild vasomotor.  Patient's last menstrual period was 2016.  Pt current method offamily planning is tubal ligation.  Pt denies h/o abnormal pap, last pap .  Pt denies active sexually transmitted infections.  Pt performs monthly self breast examination, former smoker, uses seat belts, and denies abuse.   Pt denies any vaginal bleeding, vaginal discharge, dysmenorrhea, dyspareunia, pelvic pain, bloating, early satiety, unintentional weight loss, breast mass/skin changes, incontinence, GI or urinary complaints.    Otherwise, the pt is in her usual state of health and has good follow-up with her PCP.    Review of Systems:      GENERAL:  No fever, fatigue, excessive weight gain or loss  HEENT:  No headaches, hearing changes, visual disturbance  RESPIRATORY:  No cough, shortness of breath  CARDIOVASCULAR:  No chest pain, heart palpitations, leg swelling  BREAST:  No lump, pain, nipple discharge, skin changes  GASTROINTESTINAL:  No nausea, vomiting, constipation, diarrhea, abd pain, rectal bleeding   GENITOURINARY:  See HPI  ENDOCRINE:  No heat or cold intolerance  HEMATOLOGIC:  No easy bruisability or bleeding   LYMPHATICS:  No enlarged nodes  MUSCULOSKELETAL:  No joint pain or swelling  SKIN:  No rash, lesions, jaundice  NEUROLOGIC:  No dizziness, weakness, syncope  PSYCHIATRIC:  No depression, homicidal/suicidal ideations, anxiety or mood swings    Physical Exam:     /80   Ht 5' 3" (1.6 m)   Wt 107 kg (236 lb)   LMP 2016   BMI 41.81 kg/m²     GENERAL:  No acute distress, well-nourished  HEENT:  Atraumatic, anicteric, moist mucus membranes, neck supple w/o " masses.  BREAST:  Symmetric, nontender, no obvious masses, adenopathy, skin changes or nipple discharge.  LUNGS:  Clear to auscultation  HEART:  Regular rate and rhythm, no murmurs, gallops, or rubs  ABDOMEN:  Soft, non-tender, non-distended, normoactive bowel sounds, no obvious organomegaly  EXT:  Symmetric w/o cramping, claudication, or edema. +2 distal pulses.  SKIN:  No rashes or bruising  PSYCH:  Mood and affect appropriate  GENITOURINARY:  NFEG no lesion. No vaginal or cervical lesion. Mild age appropriate vaginal atrophy. No bleeding or discharge. No CMT. Uterus and ovaries small, NT. Declined rectal exam. No obvious external lesions.    Chaperone present for exam.    Assessment:     57 y.o.  with h/o BTL:    1. Well woman gynecologic exam  2. Menopause    Plan:    A gynecologic health assessment was performed with age appropriate counseling.    Cervical cancer screening - pap up to date.    STI screening - pt declined.      Screening mammogram scheduled.    Discussed the physiologic changes associated with menopause including various treatment options.  Pt is not interested in hormone replacement therapy.  We discussed non-estrogen, alternative therapies, and lifestyle modifications for menopausal symptoms.  Postmenopausal bleeding precautions.       Encourage healthy lifestyle modifications, monthly self breast exams, Ca/Vit D, f/u with PCP for health maintenance.    F/u 1 year for GYN exam, or sooner as needed.      All questions answered, pt voiced understanding.        Simon Vasquez MD

## 2018-10-11 ENCOUNTER — TELEPHONE (OUTPATIENT)
Dept: FAMILY MEDICINE | Facility: CLINIC | Age: 58
End: 2018-10-11

## 2018-10-11 NOTE — TELEPHONE ENCOUNTER
----- Message from Zaira Tristan sent at 10/11/2018  7:34 AM CDT -----  Contact: Self   Pt states that she didn't received cetirizine (ZYRTEC) 10 MG tablet.     Please send to     [facility]/pharmacy #[id number] - [place], [place] - [address]  [address]  [place] [address]  Phone: [phone] Fax: [phone]

## 2018-10-11 NOTE — TELEPHONE ENCOUNTER
Received fax from patient pharmacy stating that Bepreve is not covered by patient's insurance. Please advise.

## 2018-10-17 RX ORDER — KETOTIFEN FUMARATE 0.35 MG/ML
1 SOLUTION/ DROPS OPHTHALMIC 2 TIMES DAILY
Qty: 10 ML | Refills: 1 | Status: SHIPPED | OUTPATIENT
Start: 2018-10-17 | End: 2019-05-14 | Stop reason: ALTCHOICE

## 2018-10-18 ENCOUNTER — PATIENT MESSAGE (OUTPATIENT)
Dept: OBSTETRICS AND GYNECOLOGY | Facility: CLINIC | Age: 58
End: 2018-10-18

## 2018-10-24 ENCOUNTER — HOSPITAL ENCOUNTER (OUTPATIENT)
Dept: RADIOLOGY | Facility: HOSPITAL | Age: 58
Discharge: HOME | End: 2018-10-24
Attending: FAMILY MEDICINE
Payer: COMMERCIAL

## 2018-10-24 DIAGNOSIS — Z12.31 ENCOUNTER FOR SCREENING MAMMOGRAM FOR MALIGNANT NEOPLASM OF BREAST: ICD-10-CM

## 2018-10-24 PROCEDURE — 77067 SCR MAMMO BI INCL CAD: CPT | Mod: 26,,, | Performed by: RADIOLOGY

## 2018-10-24 PROCEDURE — 77063 BREAST TOMOSYNTHESIS BI: CPT | Mod: TC

## 2018-10-24 PROCEDURE — 77063 BREAST TOMOSYNTHESIS BI: CPT | Mod: 26,,, | Performed by: RADIOLOGY

## 2018-10-30 DIAGNOSIS — M54.5 CHRONIC MIDLINE LOW BACK PAIN, WITH SCIATICA PRESENCE UNSPECIFIED: ICD-10-CM

## 2018-10-30 DIAGNOSIS — I10 ESSENTIAL HYPERTENSION: ICD-10-CM

## 2018-10-30 DIAGNOSIS — G89.29 CHRONIC MIDLINE LOW BACK PAIN, WITH SCIATICA PRESENCE UNSPECIFIED: ICD-10-CM

## 2018-10-30 RX ORDER — TIZANIDINE 4 MG/1
4 TABLET ORAL 2 TIMES DAILY PRN
Qty: 60 TABLET | Refills: 1 | Status: SHIPPED | OUTPATIENT
Start: 2018-10-30 | End: 2022-09-15 | Stop reason: SDUPTHER

## 2018-10-30 RX ORDER — LOSARTAN POTASSIUM 50 MG/1
50 TABLET ORAL DAILY
Qty: 90 TABLET | Refills: 2 | Status: SHIPPED | OUTPATIENT
Start: 2018-10-30 | End: 2023-06-16 | Stop reason: SDUPTHER

## 2018-10-30 RX ORDER — HYDROCHLOROTHIAZIDE 25 MG/1
TABLET ORAL
Qty: 30 TABLET | Refills: 0 | Status: SHIPPED | OUTPATIENT
Start: 2018-10-30 | End: 2018-11-08

## 2018-11-08 DIAGNOSIS — I10 ESSENTIAL HYPERTENSION: ICD-10-CM

## 2018-11-08 RX ORDER — HYDROCHLOROTHIAZIDE 25 MG/1
25 TABLET ORAL DAILY
Qty: 90 TABLET | Refills: 0 | Status: SHIPPED | OUTPATIENT
Start: 2018-11-08 | End: 2023-03-16 | Stop reason: SDUPTHER

## 2018-11-29 ENCOUNTER — TELEPHONE (OUTPATIENT)
Dept: FAMILY MEDICINE | Facility: CLINIC | Age: 58
End: 2018-11-29

## 2018-11-29 NOTE — TELEPHONE ENCOUNTER
----- Message from Verona Batista sent at 11/29/2018  9:51 AM CST -----  Contact: Self/ [phone]  Pt requesting RX for nicotine patches. Please call to advise. Thank you.  .  [facility]/pharmacy #[id number] - [place], [place] - [address]  [address]  [place] [address]  Phone: [phone] Fax: [phone]

## 2018-11-30 ENCOUNTER — TELEPHONE (OUTPATIENT)
Dept: FAMILY MEDICINE | Facility: CLINIC | Age: 58
End: 2018-11-30

## 2018-11-30 DIAGNOSIS — F17.200 TOBACCO DEPENDENCE: Primary | ICD-10-CM

## 2018-11-30 RX ORDER — IBUPROFEN 200 MG
1 TABLET ORAL DAILY
Qty: 28 PATCH | Refills: 0 | Status: SHIPPED | OUTPATIENT
Start: 2018-11-30 | End: 2019-01-02 | Stop reason: SDUPTHER

## 2018-11-30 NOTE — TELEPHONE ENCOUNTER
----- Message from Joie Marie sent at 11/30/2018 11:55 AM CST -----  Contact: Self/ [phone]  Patient would like staff to give her a call about patches.  Thank you

## 2018-11-30 NOTE — TELEPHONE ENCOUNTER
Patient states that the Nicoderm 21mg 24hr patches are covered. Order pended, please sign if appropriate.

## 2019-01-02 DIAGNOSIS — M54.5 CHRONIC MIDLINE LOW BACK PAIN, WITH SCIATICA PRESENCE UNSPECIFIED: ICD-10-CM

## 2019-01-02 DIAGNOSIS — G89.29 CHRONIC MIDLINE LOW BACK PAIN, WITH SCIATICA PRESENCE UNSPECIFIED: ICD-10-CM

## 2019-01-02 DIAGNOSIS — F17.200 TOBACCO DEPENDENCE: ICD-10-CM

## 2019-01-02 RX ORDER — IBUPROFEN 200 MG
1 TABLET ORAL DAILY
Qty: 28 PATCH | Refills: 0 | Status: SHIPPED | OUTPATIENT
Start: 2019-01-02 | End: 2019-02-02 | Stop reason: SDUPTHER

## 2019-01-04 RX ORDER — NAPROXEN SODIUM 550 MG/1
550 TABLET ORAL 2 TIMES DAILY WITH MEALS
Qty: 60 TABLET | Refills: 2 | Status: SHIPPED | OUTPATIENT
Start: 2019-01-04 | End: 2019-03-31 | Stop reason: SDUPTHER

## 2019-02-02 DIAGNOSIS — F17.200 TOBACCO DEPENDENCE: ICD-10-CM

## 2019-02-04 RX ORDER — IBUPROFEN 200 MG
1 TABLET ORAL DAILY
Qty: 28 PATCH | Refills: 0 | Status: SHIPPED | OUTPATIENT
Start: 2019-02-04 | End: 2019-03-06 | Stop reason: SDUPTHER

## 2019-03-06 DIAGNOSIS — F17.200 TOBACCO DEPENDENCE: ICD-10-CM

## 2019-03-06 RX ORDER — IBUPROFEN 200 MG
1 TABLET ORAL DAILY
Qty: 28 PATCH | Refills: 0 | Status: SHIPPED | OUTPATIENT
Start: 2019-03-06 | End: 2022-05-17 | Stop reason: SDUPTHER

## 2019-03-31 DIAGNOSIS — M54.5 CHRONIC MIDLINE LOW BACK PAIN, WITH SCIATICA PRESENCE UNSPECIFIED: ICD-10-CM

## 2019-03-31 DIAGNOSIS — G89.29 CHRONIC MIDLINE LOW BACK PAIN, WITH SCIATICA PRESENCE UNSPECIFIED: ICD-10-CM

## 2019-04-01 RX ORDER — NAPROXEN SODIUM 550 MG/1
550 TABLET ORAL 2 TIMES DAILY WITH MEALS
Qty: 60 TABLET | Refills: 0 | Status: SHIPPED | OUTPATIENT
Start: 2019-04-01 | End: 2023-03-16

## 2019-04-08 DIAGNOSIS — F17.200 TOBACCO DEPENDENCE: ICD-10-CM

## 2019-04-08 RX ORDER — IBUPROFEN 200 MG
1 TABLET ORAL DAILY
Qty: 28 PATCH | Refills: 0 | OUTPATIENT
Start: 2019-04-08

## 2019-05-14 ENCOUNTER — CLINICAL SUPPORT (OUTPATIENT)
Dept: OTOLARYNGOLOGY | Facility: CLINIC | Age: 59
End: 2019-05-14
Payer: COMMERCIAL

## 2019-05-14 ENCOUNTER — OFFICE VISIT (OUTPATIENT)
Dept: OTOLARYNGOLOGY | Facility: CLINIC | Age: 59
End: 2019-05-14
Payer: COMMERCIAL

## 2019-05-14 VITALS — BODY MASS INDEX: 41.82 KG/M2 | HEIGHT: 63 IN | WEIGHT: 236 LBS

## 2019-05-14 DIAGNOSIS — H93.8X3 SENSATION OF FULLNESS IN EAR, BILATERAL: ICD-10-CM

## 2019-05-14 DIAGNOSIS — H93.293 ABNORMAL AUDITORY PERCEPTION OF BOTH EARS: ICD-10-CM

## 2019-05-14 DIAGNOSIS — J01.90 ACUTE NON-RECURRENT SINUSITIS, UNSPECIFIED LOCATION: Primary | ICD-10-CM

## 2019-05-14 DIAGNOSIS — H69.92 DYSFUNCTION OF LEFT EUSTACHIAN TUBE: ICD-10-CM

## 2019-05-14 DIAGNOSIS — J30.9 ALLERGIC RHINITIS, UNSPECIFIED SEASONALITY, UNSPECIFIED TRIGGER: ICD-10-CM

## 2019-05-14 DIAGNOSIS — L30.9 ECZEMA, UNSPECIFIED TYPE: ICD-10-CM

## 2019-05-14 DIAGNOSIS — H10.13 ALLERGIC CONJUNCTIVITIS OF BOTH EYES: ICD-10-CM

## 2019-05-14 DIAGNOSIS — Z01.10 ENCOUNTER FOR HEARING EXAMINATION WITHOUT ABNORMAL FINDINGS: Primary | ICD-10-CM

## 2019-05-14 DIAGNOSIS — R42 DIZZINESS: ICD-10-CM

## 2019-05-14 PROCEDURE — 92550 TYMPANOMETRY & REFLEX THRESH: CPT | Mod: S$GLB,,, | Performed by: AUDIOLOGIST-HEARING AID FITTER

## 2019-05-14 PROCEDURE — 99204 OFFICE O/P NEW MOD 45 MIN: CPT | Mod: S$GLB,,, | Performed by: SPECIALIST

## 2019-05-14 PROCEDURE — 92557 COMPREHENSIVE HEARING TEST: CPT | Mod: S$GLB,,, | Performed by: AUDIOLOGIST-HEARING AID FITTER

## 2019-05-14 PROCEDURE — 99204 PR OFFICE/OUTPT VISIT, NEW, LEVL IV, 45-59 MIN: ICD-10-PCS | Mod: S$GLB,,, | Performed by: SPECIALIST

## 2019-05-14 PROCEDURE — 92557 PR COMPREHENSIVE HEARING TEST: ICD-10-PCS | Mod: S$GLB,,, | Performed by: AUDIOLOGIST-HEARING AID FITTER

## 2019-05-14 PROCEDURE — 92550 PR TYMPANOMETRY AND REFLEX THRESHOLD MEASUREMENTS: ICD-10-PCS | Mod: S$GLB,,, | Performed by: AUDIOLOGIST-HEARING AID FITTER

## 2019-05-14 RX ORDER — AMOXICILLIN 875 MG/1
875 TABLET, FILM COATED ORAL 2 TIMES DAILY
Qty: 20 TABLET | Refills: 0 | Status: SHIPPED | OUTPATIENT
Start: 2019-05-14 | End: 2019-05-24

## 2019-05-14 RX ORDER — LORATADINE 10 MG/1
10 TABLET ORAL DAILY
Qty: 30 TABLET | Refills: 11
Start: 2019-05-14 | End: 2023-03-16

## 2019-05-14 RX ORDER — MONTELUKAST SODIUM 10 MG/1
TABLET ORAL
Qty: 30 TABLET | Refills: 11 | Status: SHIPPED | OUTPATIENT
Start: 2019-05-14 | End: 2019-06-20 | Stop reason: SDUPTHER

## 2019-05-14 RX ORDER — TRIAMCINOLONE ACETONIDE 5 MG/G
CREAM TOPICAL
Qty: 15 G | Refills: 3 | Status: SHIPPED | OUTPATIENT
Start: 2019-05-14 | End: 2020-07-02

## 2019-05-14 RX ORDER — OLOPATADINE HYDROCHLORIDE 1 MG/ML
1 SOLUTION/ DROPS OPHTHALMIC 2 TIMES DAILY
Qty: 5 ML | Refills: 5 | Status: SHIPPED | OUTPATIENT
Start: 2019-05-14 | End: 2022-09-15

## 2019-05-14 NOTE — PROGRESS NOTES
Subjective:       Patient ID: Lulú Rosenthal is a 58 y.o. female.    Chief Complaint: Sinus Problem and Ear Fullness    The patient has been noting the sound of air in a tunnel or rushing water for the last 6 weeks.  Onset was after she was briskly hugged by a friend.  She is having some congestion crusting in the nasal passages.  She does have spring and fall allergies.  She has been taking Zyrtec.  She is also nose some swelling in the facial area with a rash in the skin beneath her right eye.  At other time she developed similar rashes on nasolabial folds in mento-jowl grooves.    Review of Systems   Constitutional: Positive for fatigue. Negative for activity change, appetite change, chills, fever and unexpected weight change.   HENT: Positive for congestion, ear pain, hearing loss, postnasal drip, rhinorrhea, sinus pressure, sinus pain and tinnitus. Negative for ear discharge, facial swelling, mouth sores, sneezing, sore throat, trouble swallowing and voice change.    Eyes: Negative for photophobia, pain, discharge, redness, itching and visual disturbance.        Rash right lower eyelid   Respiratory: Positive for cough. Negative for apnea, choking, shortness of breath and wheezing.    Cardiovascular: Negative for chest pain and palpitations.   Gastrointestinal: Negative for abdominal distention, abdominal pain, nausea and vomiting.   Musculoskeletal: Negative for arthralgias, myalgias, neck pain and neck stiffness.   Skin: Negative.  Negative for color change, pallor and rash.   Allergic/Immunologic: Positive for environmental allergies. Negative for food allergies and immunocompromised state.   Neurological: Positive for headaches. Negative for dizziness, facial asymmetry, speech difficulty, weakness, light-headedness and numbness.   Hematological: Negative for adenopathy. Does not bruise/bleed easily.   Psychiatric/Behavioral: Negative for confusion, decreased concentration and sleep disturbance.        Objective:      Physical Exam   Constitutional: She is oriented to person, place, and time. She appears well-developed and well-nourished. She is cooperative.   HENT:   Head: Normocephalic.   Right Ear: External ear and ear canal normal. Tympanic membrane is retracted. Tympanic membrane mobility is abnormal.   Left Ear: External ear and ear canal normal. Tympanic membrane is retracted. Tympanic membrane mobility is abnormal (Nonmobile on pneumatic otoscopy).   Nose: Mucosal edema (cyanotic, boggy inferior turbinates bilaterally), rhinorrhea (clear mucus bilaterally with the yellow crusting in the vestibule on the right) and septal deviation ( to the right) present.   Mouth/Throat: Uvula is midline, oropharynx is clear and moist and mucous membranes are normal. No oral lesions. Tonsils are 3+ on the right. Tonsils are 3+ on the left. No tonsillar exudate.   Eyes: Pupils are equal, round, and reactive to light. EOM and lids are normal. Right eye exhibits no discharge and no exudate. Left eye exhibits no discharge and no exudate. Right conjunctiva is injected. Left conjunctiva is injected.       Right lower eyelid-area of maximum of medially   Neck: Trachea normal and normal range of motion. No muscular tenderness present. No tracheal deviation present. No thyroid mass and no thyromegaly present.   Cardiovascular: Normal rate, regular rhythm, normal heart sounds and normal pulses.   Pulmonary/Chest: Effort normal and breath sounds normal. No stridor. She has no decreased breath sounds. She has no wheezes. She has no rhonchi. She has no rales.   Abdominal: Soft. Bowel sounds are normal. There is no tenderness.   Musculoskeletal: Normal range of motion.   Lymphadenopathy:        Head (right side): No submental, no submandibular, no preauricular, no posterior auricular and no occipital adenopathy present.        Head (left side): No submental, no submandibular, no preauricular, no posterior auricular and no occipital  adenopathy present.     She has no cervical adenopathy.   Neurological: She is alert and oriented to person, place, and time. She has normal strength. No cranial nerve deficit or sensory deficit. Gait normal.   Skin: Skin is warm and dry. No petechiae and no rash noted. No cyanosis. Nails show no clubbing.   Skin-maximum of right lower eyelid   Psychiatric: She has a normal mood and affect. Her speech is normal and behavior is normal. Judgment and thought content normal. Cognition and memory are normal.             Assessment:       1. Acute non-recurrent sinusitis, unspecified location    2. Allergic rhinitis, unspecified seasonality, unspecified trigger    3. Dysfunction of left eustachian tube    4. Abnormal auditory perception of both ears    5. Allergic conjunctivitis of both eyes    6. Eczema, unspecified type        Plan:       I will have the patient use Claritin every morning.  She has eyedrops for the itching eyes and a steroid cream for the eczema.  In 2 weeks if symptoms are not controlled she will will add a nighttime dose of Singulair.  I will recheck her in 4 weeks.

## 2019-05-14 NOTE — PROGRESS NOTES
Joshua Hansen, CCC-A  Audiologist - [facility] [address] [place] [address]  [email or web address]  [phone]    Patient: Lulú Rosenthal   MRN: [id number]  : 1960  COCHRAN: 2019      AUDIOLOGICAL EVALUATION      IMPRESSION:   Audiological testing indicated that Lulú Rosenthal has normal hearing in both ears.    RECOMMENDATIONS:   It is recommended that she:   Follow up medically with a physician to assess dizziness.     Use precaution and/or hearing protection in noisy environments.      If you should have any questions or concerns regarding the above information, please do not hesitate to contact me at [phone].      _______________________________  Joshua Hansen, MAGO-A  Audiologist

## 2019-06-20 ENCOUNTER — OFFICE VISIT (OUTPATIENT)
Dept: OTOLARYNGOLOGY | Facility: CLINIC | Age: 59
End: 2019-06-20
Payer: COMMERCIAL

## 2019-06-20 VITALS — HEIGHT: 63 IN | WEIGHT: 236 LBS | BODY MASS INDEX: 41.82 KG/M2

## 2019-06-20 DIAGNOSIS — J30.9 ALLERGIC RHINITIS, UNSPECIFIED SEASONALITY, UNSPECIFIED TRIGGER: Primary | ICD-10-CM

## 2019-06-20 DIAGNOSIS — J34.2 NASAL SEPTAL DEVIATION: ICD-10-CM

## 2019-06-20 DIAGNOSIS — H10.13 ALLERGIC CONJUNCTIVITIS OF BOTH EYES: ICD-10-CM

## 2019-06-20 DIAGNOSIS — H69.92 DYSFUNCTION OF LEFT EUSTACHIAN TUBE: ICD-10-CM

## 2019-06-20 DIAGNOSIS — L30.9 ECZEMA, UNSPECIFIED TYPE: ICD-10-CM

## 2019-06-20 PROCEDURE — 99214 OFFICE O/P EST MOD 30 MIN: CPT | Mod: S$GLB,,, | Performed by: SPECIALIST

## 2019-06-20 PROCEDURE — 99214 PR OFFICE/OUTPT VISIT, EST, LEVL IV, 30-39 MIN: ICD-10-PCS | Mod: S$GLB,,, | Performed by: SPECIALIST

## 2019-06-20 RX ORDER — CETIRIZINE HYDROCHLORIDE 10 MG/1
10 TABLET ORAL DAILY
Refills: 5 | COMMUNITY
Start: 2019-05-23 | End: 2023-06-16 | Stop reason: SDUPTHER

## 2019-06-20 RX ORDER — MONTELUKAST SODIUM 10 MG/1
TABLET ORAL
Qty: 30 TABLET | Refills: 11 | Status: SHIPPED | OUTPATIENT
Start: 2019-06-20 | End: 2022-09-15

## 2019-06-20 NOTE — PROGRESS NOTES
I will have the patient use her morning dose with Claritin D and evening dose of Singulair Subjective:       Patient ID: Lulú Rosenthal is a 58 y.o. female.    Chief Complaint: Sinus Problem and Ear Fullness    The patient is returning for follow-up visit.  She continues to have blockage in her left ear.  The use of Claritin D and eyedrops has not been particularly helpful.  She is having increased hearing and itching in both eyes and eczema in her ears bilaterally. She is having pain in    Review of Systems   Constitutional: Positive for fatigue. Negative for activity change, appetite change, chills, fever and unexpected weight change.   HENT: Positive for congestion, ear pain, postnasal drip, rhinorrhea, sinus pressure and sinus pain. Negative for ear discharge, facial swelling, hearing loss, mouth sores, sneezing, sore throat, tinnitus, trouble swallowing and voice change.    Eyes: Positive for discharge, redness and itching. Negative for photophobia, pain and visual disturbance.   Respiratory: Positive for cough. Negative for apnea, choking, shortness of breath and wheezing.    Cardiovascular: Negative for chest pain and palpitations.   Gastrointestinal: Negative for abdominal distention, abdominal pain, nausea and vomiting.   Musculoskeletal: Negative for arthralgias, myalgias, neck pain and neck stiffness.   Skin: Negative.  Negative for color change, pallor and rash.   Allergic/Immunologic: Positive for environmental allergies. Negative for food allergies and immunocompromised state.   Neurological: Positive for headaches. Negative for dizziness, facial asymmetry, speech difficulty, weakness, light-headedness and numbness.   Hematological: Negative for adenopathy. Does not bruise/bleed easily.   Psychiatric/Behavioral: Negative for confusion, decreased concentration and sleep disturbance.       Objective:      Physical Exam   Constitutional: She is oriented to person, place, and time. She appears  well-developed and well-nourished. She is cooperative.   HENT:   Head: Normocephalic.   Right Ear: External ear and ear canal normal. Tympanic membrane is retracted.   Left Ear: External ear and ear canal normal. Tympanic membrane is retracted.   Nose: Mucosal edema (cyanotic, boggy inferior turbinates bilaterally), rhinorrhea (clear mucus bilaterally) and septal deviation (To the right) present.   Mouth/Throat: Uvula is midline, oropharynx is clear and moist and mucous membranes are normal. No oral lesions.   Eyes: Pupils are equal, round, and reactive to light. EOM and lids are normal. Right eye exhibits no discharge and no exudate. Left eye exhibits no discharge and no exudate. Right conjunctiva is injected. Left conjunctiva is injected.   Neck: Trachea normal and normal range of motion. No muscular tenderness present. No tracheal deviation present. No thyroid mass and no thyromegaly present.   Cardiovascular: Normal rate, regular rhythm, normal heart sounds and normal pulses.   Pulmonary/Chest: Effort normal and breath sounds normal. No stridor. She has no decreased breath sounds. She has no wheezes. She has no rhonchi. She has no rales.   Abdominal: Soft. Bowel sounds are normal. There is no tenderness.   Musculoskeletal: Normal range of motion.   Lymphadenopathy:        Head (right side): No submental, no submandibular, no preauricular, no posterior auricular and no occipital adenopathy present.        Head (left side): No submental, no submandibular, no preauricular, no posterior auricular and no occipital adenopathy present.     She has no cervical adenopathy.   Neurological: She is alert and oriented to person, place, and time. She has normal strength. No cranial nerve deficit or sensory deficit. Gait normal.   Skin: Skin is warm and dry. No petechiae and no rash noted. No cyanosis. Nails show no clubbing.   Psychiatric: She has a normal mood and affect. Her speech is normal and behavior is normal. Judgment  and thought content normal. Cognition and memory are normal.       Assessment:       1. Allergic rhinitis, unspecified seasonality, unspecified trigger    2. Eczema, unspecified type    3. Dysfunction of left eustachian tube    4. Allergic conjunctivitis of both eyes    5. Nasal septal deviation        Plan:       I will add a nighttime dose of Singulair to the patient's morning dose Claritin D. I will recheck her in 4 weeks.  She has concerns about deepening of her nasolabial folds and marionette lines.  We discussed two methods to deal with this.  The 1st would be using Fillers and the 2nd is with a face left.  She will consider those options.  She understands both are purely cosmetic in nature.

## 2019-07-14 ENCOUNTER — HOSPITAL ENCOUNTER (EMERGENCY)
Facility: HOSPITAL | Age: 59
Discharge: HOME | End: 2019-07-14
Attending: EMERGENCY MEDICINE
Payer: COMMERCIAL

## 2019-07-14 VITALS
HEIGHT: 63 IN | DIASTOLIC BLOOD PRESSURE: 89 MMHG | TEMPERATURE: 99 F | OXYGEN SATURATION: 97 % | SYSTOLIC BLOOD PRESSURE: 182 MMHG | BODY MASS INDEX: 41.64 KG/M2 | RESPIRATION RATE: 18 BRPM | WEIGHT: 235 LBS | HEART RATE: 86 BPM

## 2019-07-14 DIAGNOSIS — K11.5 SIALOLITHIASIS: Primary | ICD-10-CM

## 2019-07-14 PROCEDURE — 99283 EMERGENCY DEPT VISIT LOW MDM: CPT | Mod: ,,, | Performed by: EMERGENCY MEDICINE

## 2019-07-14 PROCEDURE — 99283 EMERGENCY DEPT VISIT LOW MDM: CPT

## 2019-07-14 PROCEDURE — 25000003 PHARM REV CODE 250: Performed by: EMERGENCY MEDICINE

## 2019-07-14 PROCEDURE — 99283 PR EMERGENCY DEPT VISIT,LEVEL III: ICD-10-PCS | Mod: ,,, | Performed by: EMERGENCY MEDICINE

## 2019-07-14 RX ORDER — IBUPROFEN 400 MG/1
400 TABLET ORAL
Status: COMPLETED | OUTPATIENT
Start: 2019-07-14 | End: 2019-07-14

## 2019-07-14 RX ORDER — IBUPROFEN 400 MG/1
400 TABLET ORAL EVERY 6 HOURS PRN
Qty: 20 TABLET | Refills: 0 | Status: SHIPPED | OUTPATIENT
Start: 2019-07-14 | End: 2023-03-16

## 2019-07-14 RX ADMIN — IBUPROFEN 400 MG: 400 TABLET, FILM COATED ORAL at 06:07

## 2019-07-14 NOTE — ED PROVIDER NOTES
Encounter Date: 7/14/2019       History     Chief Complaint   Patient presents with    Neck Swelling     began 0830, hurts to swallow, denies sob, denies fever      58-year-old female with past medical history of hypertension, allergic rhinitis, carpal tunnel syndrome, coming in with left-sided neck swelling since this morning after she ate 2 pieces of simmons.  She describes it is painful worse with swallowing radiates up to her ear.  No previous similar symptoms. No fevers.  No recent URIs, sore throat, runny nose, allergies.  No swelling in any other parts of her body.  She denies redness of the skin.  No trouble breathing, no trouble tolerating secretions.  No voice changes.  No rashes. Changes in hearing.  Symptoms seem to be made worse with eating (she had tuna sandwich later in the day).  No vomiting, no nausea, no chest pain, shortness of breath, abdominal pain, diarrhea.  Patient has not tried anything at home to alleviate the pain.    On chart review was recently seen by ENT for allergic rhinitis/sinusitis.  No similar complaints as today's previously.        Review of patient's allergies indicates:   Allergen Reactions    Flonase [fluticasone] Swelling and Other (See Comments)     Nasal Swelling     Past Medical History:   Diagnosis Date    CTS (carpal tunnel syndrome)     Hypertension      Past Surgical History:   Procedure Laterality Date    BREAST BIOPSY Left 2013    Core bx, benign    CEREBRAL ANEURYSM REPAIR      TUBAL LIGATION       Family History   Problem Relation Age of Onset    Heart disease Mother     Hypertension Mother     Diabetes Father     Heart disease Father     Psoriasis Neg Hx     Eczema Neg Hx      Social History     Tobacco Use    Smoking status: Former Smoker     Packs/day: 0.80     Types: Cigarettes     Last attempt to quit: 3/6/2016     Years since quitting: 3.3    Smokeless tobacco: Never Used   Substance Use Topics    Alcohol use: Yes     Alcohol/week: 1.8 oz      Types: 3 Glasses of wine per week     Comment: occasionally    Drug use: No     Review of Systems   Constitutional:  No Fever, No Chills,   Eyes: No Vision Changes  ENT/Mouth: + left-sided neck swelling, No rhinorrhea  Cardiovascular:  No Chest Pain, No Palpitations  Respiratory:  No Cough, No SOB  Gastrointestinal:  No Nausea, No Vomiting, No Diarrhea, No abdo pain.  Genitourinary:  No  pain, No dysuria   Musculoskeletal:  No Arthralgias, No Back Pain, No Neck Pain, No recent trauma.  Skin:  No skin Lesions  Neuro:  No Weakness, No Dizziness, No Headache        Physical Exam     Initial Vitals [07/14/19 1805]   BP Pulse Resp Temp SpO2   (!) 182/89 86 18 99 °F (37.2 °C) 97 %      MAP       --         Physical Exam   Physical Exam:  GENERAL APPEARANCE: Well developed, well nourished, in no acute distress.  HENT: Normocephalic, atraumatic, oropharynx is unremarkable, there is no erythema, no exudate, uvula is midline. No obvious visualized salivary duct stone.  Left submandibular salivary gland is mildly/moderately swollen with no excessive warmth, no skin changes, no fluctuance.  No significant tenderness. TMs bilaterally are unremarkable.  EYES: Sclerae anicteric   NECK: Supple, no thyroid enlargement  CARDIOVASCULAR: Regular rate and rhythm without any murmurs, gallops, rubs.  LUNGS: Speaking in full sentences. Breathing comfortably. Auscultation of the lungs revealed normal breath sounds b/l  NEUROLOGIC: Alert, interacting normally. No facial droop.   MSK: Moving all four extremities.  Skin: Warm and dry. No visible rash on exposed areas of skin.    Psych: Mood and affect normal.       ED Course   Procedures  Labs Reviewed - No data to display       Imaging Results    None          Medical Decision Making:   Initial Assessment:   58-year-old female with left-sided neck swelling after eating simmons this morning.  Exam is consistent with salivary duct stone causing salivary duct swelling.  There is no fever, there  is no significant salivary gland tenderness, there is no skin changes, patient is otherwise well-appearing, this is not consistent with a salivary gland infection at this time.  Will give dose of ibuprofen in the emergency department.  Recommend salivary duct massage, warm compresses, lemon drops versus lemon to help with this elevation expulsion of stone.  Return precautions for fever, worsening pain, skin changes, trouble swallowing, or any other new, worsening or worrisome symptoms. ENT follow-up.  BP noted, patient is to continue to take their prescription BP meds and follow up with PMD for continued titration of BP medications for blood pressure optimization.       MDM Complexity Points:   Problem Points:  1.New problem, with no additional ED work-up planned (maximum of 1) - left-sided neck swelling  2.Self-limited or minor (maximum of 2) - elevated blood pressure    Data Points:  Decision to obtain old records (in the EHR) and Review and summarization of old records                          Clinical Impression:       ICD-10-CM ICD-9-CM   1. Sialolithiasis K11.5 527.5                                Tristian Muñoz MD  07/14/19 8020

## 2019-07-14 NOTE — ED TRIAGE NOTES
Lulú Rosenthal, a 58 y.o. female presents to the ED w/ complaint of left sided neck swelling and tenderness. Patient reports pain with swallowing with pain radiating to left ear but denies trouble breathing. Patient reports eating two pieces of simmons then an hour later noticed the swelling to jaw/throat.     Triage note:  Chief Complaint   Patient presents with    Neck Swelling     began 0830, hurts to swallow, denies sob, denies fever      Review of patient's allergies indicates:   Allergen Reactions    Flonase [fluticasone] Swelling and Other (See Comments)     Nasal Swelling     Past Medical History:   Diagnosis Date    CTS (carpal tunnel syndrome)     Hypertension      LOC: Patient name and date of birth verified. The patient is awake, alert and aware of environment with an appropriate affect, the patient is oriented x 3 and speaking appropriately.   APPEARANCE: Patient resting comfortably, patient is clean and well groomed, patient's clothing is properly fastened.  SKIN: The skin is warm and dry, color consistent with ethnicity, patient has normal skin turgor and moist mucus membranes, skin intact, no breakdown or bruising noted.  MUSCULOSKELETAL: Patient moving all extremities well, no obvious swelling or deformities noted.   RESPIRATORY: Respirations are spontaneous, patient has a normal effort and rate, no accessory muscle use noted.  CARDIAC: Patient has a normal rate and rhythm, no periphreal edema noted, capillary refill < 3 seconds.  ABDOMEN: Soft and non tender to palpation, no distention noted. Bowel sounds present in all four quadrants.  NEUROLOGIC: Eyes open spontaneously, behavior appropriate to situation, follows commands, facial expression symmetrical, bilateral hand grasp equal and even, purposeful motor response noted, normal sensation in all extremities when touched with a finger.  HEENT: swelling and tenderness to L throat

## 2019-07-14 NOTE — DISCHARGE INSTRUCTIONS
Based on your history and physical exam it appears to have a blocked salivary duct.    Please attempt to take lemon drops several times daily, or tart foods (such as frank), which will help reduce saliva and help expulse the stone.  Additionally can take ibuprofen to help with swelling and discomfort.    If you develop redness around the area in the neck, fevers, increased pain, or any other new, worsening or worrisome symptoms please return to the emergency department for re-evaluation.  It is occasionally possible for you're salivary gland to become infected.  It does not appear to be infected at this time.    If your symptoms do not completely resolved in 2-3 days please follow-up with your ENT doctors for continued evaluation and treatment.    Your blood pressure was elevated today, this could be secondary to the discomfort, please continue to take your blood pressure medications, follow-up with your primary care doctor for continued management of Yourblood pressure.

## 2019-09-09 ENCOUNTER — IMMUNIZATION (OUTPATIENT)
Dept: PHARMACY | Facility: CLINIC | Age: 59
End: 2019-09-09
Payer: COMMERCIAL

## 2019-09-13 DIAGNOSIS — Z12.31 BREAST CANCER SCREENING BY MAMMOGRAM: Primary | ICD-10-CM

## 2019-10-25 ENCOUNTER — HOSPITAL ENCOUNTER (OUTPATIENT)
Dept: RADIOLOGY | Facility: HOSPITAL | Age: 59
Discharge: HOME | End: 2019-10-25
Attending: INTERNAL MEDICINE
Payer: COMMERCIAL

## 2019-10-25 DIAGNOSIS — Z12.31 BREAST CANCER SCREENING BY MAMMOGRAM: ICD-10-CM

## 2019-10-25 PROCEDURE — 77063 MAMMO DIGITAL SCREENING BILAT WITH TOMOSYNTHESIS_CAD: ICD-10-PCS | Mod: 26,,, | Performed by: RADIOLOGY

## 2019-10-25 PROCEDURE — 77067 SCR MAMMO BI INCL CAD: CPT | Mod: 26,,, | Performed by: RADIOLOGY

## 2019-10-25 PROCEDURE — 77067 SCR MAMMO BI INCL CAD: CPT | Mod: TC

## 2019-10-25 PROCEDURE — 77063 BREAST TOMOSYNTHESIS BI: CPT | Mod: 26,,, | Performed by: RADIOLOGY

## 2019-10-25 PROCEDURE — 77067 MAMMO DIGITAL SCREENING BILAT WITH TOMOSYNTHESIS_CAD: ICD-10-PCS | Mod: 26,,, | Performed by: RADIOLOGY

## 2020-07-02 RX ORDER — TRIAMCINOLONE ACETONIDE 5 MG/G
CREAM TOPICAL
Qty: 15 G | Refills: 0 | Status: SHIPPED | OUTPATIENT
Start: 2020-07-02 | End: 2022-09-15

## 2020-09-24 DIAGNOSIS — Z12.31 BREAST CANCER SCREENING BY MAMMOGRAM: Primary | ICD-10-CM

## 2020-10-26 ENCOUNTER — HOSPITAL ENCOUNTER (OUTPATIENT)
Dept: RADIOLOGY | Facility: HOSPITAL | Age: 60
Discharge: HOME | End: 2020-10-26
Attending: INTERNAL MEDICINE
Payer: COMMERCIAL

## 2020-10-26 DIAGNOSIS — Z12.31 BREAST CANCER SCREENING BY MAMMOGRAM: ICD-10-CM

## 2020-10-26 PROCEDURE — 77063 MAMMO DIGITAL SCREENING BILAT WITH TOMO: ICD-10-PCS | Mod: 26,,, | Performed by: RADIOLOGY

## 2020-10-26 PROCEDURE — 77063 BREAST TOMOSYNTHESIS BI: CPT | Mod: 26,,, | Performed by: RADIOLOGY

## 2020-10-26 PROCEDURE — 77067 SCR MAMMO BI INCL CAD: CPT | Mod: 26,,, | Performed by: RADIOLOGY

## 2020-10-26 PROCEDURE — 77067 SCR MAMMO BI INCL CAD: CPT | Mod: TC

## 2020-10-26 PROCEDURE — 77067 MAMMO DIGITAL SCREENING BILAT WITH TOMO: ICD-10-PCS | Mod: 26,,, | Performed by: RADIOLOGY

## 2020-11-19 ENCOUNTER — OFFICE VISIT (OUTPATIENT)
Dept: OBSTETRICS AND GYNECOLOGY | Facility: CLINIC | Age: 60
End: 2020-11-19
Payer: COMMERCIAL

## 2020-11-19 VITALS
WEIGHT: 240.19 LBS | DIASTOLIC BLOOD PRESSURE: 100 MMHG | BODY MASS INDEX: 42.56 KG/M2 | SYSTOLIC BLOOD PRESSURE: 176 MMHG | HEIGHT: 63 IN

## 2020-11-19 DIAGNOSIS — Z01.419 WELL WOMAN EXAM WITH ROUTINE GYNECOLOGICAL EXAM: Primary | ICD-10-CM

## 2020-11-19 DIAGNOSIS — Z12.4 CERVICAL CANCER SCREENING: ICD-10-CM

## 2020-11-19 PROCEDURE — 99999 PR PBB SHADOW E&M-EST. PATIENT-LVL III: CPT | Mod: PBBFAC,,, | Performed by: OBSTETRICS & GYNECOLOGY

## 2020-11-19 PROCEDURE — 87624 HPV HI-RISK TYP POOLED RSLT: CPT

## 2020-11-19 PROCEDURE — 99999 PR PBB SHADOW E&M-EST. PATIENT-LVL III: ICD-10-PCS | Mod: PBBFAC,,, | Performed by: OBSTETRICS & GYNECOLOGY

## 2020-11-19 PROCEDURE — 99396 PR PREVENTIVE VISIT,EST,40-64: ICD-10-PCS | Mod: S$GLB,,, | Performed by: OBSTETRICS & GYNECOLOGY

## 2020-11-19 PROCEDURE — 88175 CYTOPATH C/V AUTO FLUID REDO: CPT

## 2020-11-19 PROCEDURE — 99396 PREV VISIT EST AGE 40-64: CPT | Mod: S$GLB,,, | Performed by: OBSTETRICS & GYNECOLOGY

## 2020-11-19 NOTE — LETTER
November 19, 2020      [facility] - OB/ GYN  [address]  [place] [address]  Phone: [phone]       Patient: Lulú Rosenthal   YOB: 1960  Date of Visit: 11/19/2020    To Whom It May Concern:    Ruddy Rosenthal  was at [facility] on 11/19/2020. She may return to work/school on 11/20/2020 with no restrictions. If you have any questions or concerns, or if I can be of further assistance, please do not hesitate to contact me.    Sincerely,    Vida Damon MA

## 2020-11-19 NOTE — PROGRESS NOTES
"[facility]  Ambulatory Clinic  Obstetrics & Gynecology    Visit Date:  2020    Chief Complaint:  Annual GYN exam    History of Present Illness:      Lulú Rosenthal is a 60 y.o. , new pt to me, here for a gynecologic exam.    Pt has no major complaints today.      Pt reports an uneventful transition into menopause and is not on hormone replacement therapy.      Pt denies h/o abnormal pap.    Pt denies active sexually transmitted infections.    Last mammo 10/2020    Pt performs monthly self breast examination, smokes tobacco, uses seat belts, and denies abuse.     Pt denies abnormal vaginal bleeding, vaginal discharge, dyspareunia, pelvic pain, bloating, early satiety, unintentional weight loss, breast mass/skin changes, incontinence, GI or urinary complaints.      Otherwise, the pt is in her usual state of health.    Past History:  Gynecologic history as noted above.    Review of Systems:      GENERAL:  No fever, fatigue, excessive weight gain or loss  HEENT:  No headaches, hearing changes, visual disturbance  RESPIRATORY:  No cough, shortness of breath  CARDIOVASCULAR:  No chest pain, heart palpitations, leg swelling  BREAST:  No lump, pain, nipple discharge, skin changes  GASTROINTESTINAL:  No nausea, vomiting, constipation, diarrhea, abd pain, rectal bleeding   GENITOURINARY:  See HPI  ENDOCRINE:  No heat or cold intolerance  HEMATOLOGIC:  No easy bruisability or bleeding   LYMPHATICS:  No enlarged nodes  MUSCULOSKELETAL:  No acute joint pain or swelling  SKIN:  No rash, lesions, jaundice  NEUROLOGIC:  No dizziness, weakness, syncope  PSYCHIATRIC:  No significant mood changes, homicidal/suicidal ideations    Physical Exam:     BP (!) 176/100   Ht 5' 3" (1.6 m)   Wt 109 kg (240 lb 3.1 oz)   LMP 2016   BMI 42.55 kg/m²   Repeat /89  Pulse 74 , Resp rate 18. Pt states she forgot to take her BP medication today     GENERAL:  No acute distress, " well-nourished  HEENT:  Atraumatic, anicteric, moist mucus membranes. Neck supple w/o masses.  BREAST:  Symmetric, nontender, no obvious masses, adenopathy, skin changes or nipple discharge.  LUNGS:  Clear to auscultation  HEART:  Regular rate and rhythm, no murmurs, gallops, or rubs  ABDOMEN:  Soft, non-tender, non-distended, normoactive bowel sounds, no obvious organomegaly  EXT:  Symmetric w/o cramping, claudication, or edema. +2 distal pulses.  SKIN:  No rashes or bruising  PSYCH:  Mood and affect appropriate  NEURO:  Grossly intact bilaterally    GENITOURINARY:    VULVAR:  Female external genitalia w/o obvious lesions. Normal urethral meatus. No gross lymphadenopathy.   VAGINA:  Mild, age appropriate vulvovaginal atrophy. No significant cystocele or rectocele. No obvious lesion. No discharge.  CERVIX:  No cervical motion tenderness, discharge, or obvious lesions.   UTERUS:  Small, non-tender, normal contour  ADNEXA:  No masses, non-tender    RECTAL:  Declined. No obvious external lesions    Chaperone present for exam.    Assessment:     60 y.o. :    1. Well woman gynecologic exam    Plan:    A gynecologic health assessment was performed with age appropriate counseling.    Cervical cancer screening - pap obtained.    STI screening - pt declined.  Safe sex discussed.      We discussed her HTN.  Pt was advised to f/u with her PCP.  Low salt diet, encourage healthy lifestyle modification, and compliance with BP meds.  Hypertensive precautions and parameters to call reviewed.    Encourage healthy lifestyle modifications, monthly self breast exams, Ca/Vit D, postmenopausal bleeding precautions, yearly mammogram, and colonoscopy.    Encourage tobacco cessation, pt not ready to quit, declined help.    F/u with PCP for health maintenance.    Return 1 year for gynecologic exam or sooner as needed.  All questions answered, pt voiced understanding.        Simon Vasquez MD

## 2020-11-25 LAB
HPV HR 12 DNA SPEC QL NAA+PROBE: NEGATIVE
HPV16 AG SPEC QL: NEGATIVE
HPV18 DNA SPEC QL NAA+PROBE: NEGATIVE

## 2021-01-07 LAB
FINAL PATHOLOGIC DIAGNOSIS: NORMAL
Lab: NORMAL

## 2021-02-23 ENCOUNTER — IMMUNIZATION (OUTPATIENT)
Dept: PRIMARY CARE CLINIC | Facility: CLINIC | Age: 61
End: 2021-02-23
Payer: COMMERCIAL

## 2021-02-23 DIAGNOSIS — Z23 NEED FOR VACCINATION: Primary | ICD-10-CM

## 2021-02-23 PROCEDURE — 91300 COVID-19, MRNA, LNP-S, PF, 30 MCG/0.3 ML DOSE VACCINE: CPT | Mod: PBBFAC | Performed by: EMERGENCY MEDICINE

## 2021-03-16 ENCOUNTER — IMMUNIZATION (OUTPATIENT)
Dept: PRIMARY CARE CLINIC | Facility: CLINIC | Age: 61
End: 2021-03-16
Payer: COMMERCIAL

## 2021-03-16 DIAGNOSIS — Z23 NEED FOR VACCINATION: Primary | ICD-10-CM

## 2021-03-16 PROCEDURE — 91300 COVID-19, MRNA, LNP-S, PF, 30 MCG/0.3 ML DOSE VACCINE: CPT | Mod: PBBFAC | Performed by: EMERGENCY MEDICINE

## 2021-03-16 PROCEDURE — 0002A COVID-19, MRNA, LNP-S, PF, 30 MCG/0.3 ML DOSE VACCINE: CPT | Mod: PBBFAC | Performed by: EMERGENCY MEDICINE

## 2021-09-27 DIAGNOSIS — Z12.31 BREAST CANCER SCREENING BY MAMMOGRAM: Primary | ICD-10-CM

## 2021-10-11 ENCOUNTER — HOSPITAL ENCOUNTER (OUTPATIENT)
Dept: RADIOLOGY | Facility: HOSPITAL | Age: 61
Discharge: HOME | End: 2021-10-11
Attending: NURSE PRACTITIONER
Payer: COMMERCIAL

## 2021-10-11 ENCOUNTER — OFFICE VISIT (OUTPATIENT)
Dept: ORTHOPEDICS | Facility: CLINIC | Age: 61
End: 2021-10-11
Payer: COMMERCIAL

## 2021-10-11 VITALS
DIASTOLIC BLOOD PRESSURE: 86 MMHG | HEART RATE: 66 BPM | HEIGHT: 63 IN | SYSTOLIC BLOOD PRESSURE: 132 MMHG | WEIGHT: 229.25 LBS | BODY MASS INDEX: 40.62 KG/M2

## 2021-10-11 DIAGNOSIS — M25.562 ACUTE PAIN OF BOTH KNEES: Primary | ICD-10-CM

## 2021-10-11 DIAGNOSIS — M25.562 ACUTE PAIN OF BOTH KNEES: ICD-10-CM

## 2021-10-11 DIAGNOSIS — M25.561 ACUTE PAIN OF BOTH KNEES: Primary | ICD-10-CM

## 2021-10-11 DIAGNOSIS — M25.561 ACUTE PAIN OF BOTH KNEES: ICD-10-CM

## 2021-10-11 PROCEDURE — 99203 PR OFFICE/OUTPT VISIT, NEW, LEVL III, 30-44 MIN: ICD-10-PCS | Mod: S$GLB,,, | Performed by: NURSE PRACTITIONER

## 2021-10-11 PROCEDURE — 73564 X-RAY EXAM KNEE 4 OR MORE: CPT | Mod: TC,50

## 2021-10-11 PROCEDURE — 73564 X-RAY EXAM KNEE 4 OR MORE: CPT | Mod: 26,50,, | Performed by: RADIOLOGY

## 2021-10-11 PROCEDURE — 99203 OFFICE O/P NEW LOW 30 MIN: CPT | Mod: S$GLB,,, | Performed by: NURSE PRACTITIONER

## 2021-10-11 PROCEDURE — 73564 XR KNEE ORTHO BILAT WITH FLEXION: ICD-10-PCS | Mod: 26,50,, | Performed by: RADIOLOGY

## 2021-10-11 PROCEDURE — 99999 PR PBB SHADOW E&M-EST. PATIENT-LVL IV: CPT | Mod: PBBFAC,,, | Performed by: NURSE PRACTITIONER

## 2021-10-11 PROCEDURE — 99999 PR PBB SHADOW E&M-EST. PATIENT-LVL IV: ICD-10-PCS | Mod: PBBFAC,,, | Performed by: NURSE PRACTITIONER

## 2021-10-14 ENCOUNTER — IMMUNIZATION (OUTPATIENT)
Dept: PHARMACY | Facility: CLINIC | Age: 61
End: 2021-10-14
Payer: COMMERCIAL

## 2021-10-27 ENCOUNTER — HOSPITAL ENCOUNTER (OUTPATIENT)
Dept: RADIOLOGY | Facility: HOSPITAL | Age: 61
Discharge: HOME | End: 2021-10-27
Attending: INTERNAL MEDICINE
Payer: COMMERCIAL

## 2021-10-27 DIAGNOSIS — Z12.31 BREAST CANCER SCREENING BY MAMMOGRAM: ICD-10-CM

## 2021-10-27 PROCEDURE — 77063 MAMMO DIGITAL SCREENING BILAT WITH TOMO: ICD-10-PCS | Mod: 26,,, | Performed by: RADIOLOGY

## 2021-10-27 PROCEDURE — 77063 BREAST TOMOSYNTHESIS BI: CPT | Mod: 26,,, | Performed by: RADIOLOGY

## 2021-10-27 PROCEDURE — 77067 MAMMO DIGITAL SCREENING BILAT WITH TOMO: ICD-10-PCS | Mod: 26,,, | Performed by: RADIOLOGY

## 2021-10-27 PROCEDURE — 77067 SCR MAMMO BI INCL CAD: CPT | Mod: 26,,, | Performed by: RADIOLOGY

## 2021-10-27 PROCEDURE — 77067 SCR MAMMO BI INCL CAD: CPT | Mod: TC

## 2021-11-18 ENCOUNTER — TELEPHONE (OUTPATIENT)
Dept: BARIATRICS | Facility: CLINIC | Age: 61
End: 2021-11-18
Payer: COMMERCIAL

## 2021-11-30 ENCOUNTER — TELEPHONE (OUTPATIENT)
Dept: BARIATRICS | Facility: CLINIC | Age: 61
End: 2021-11-30
Payer: COMMERCIAL

## 2021-12-01 ENCOUNTER — TELEPHONE (OUTPATIENT)
Dept: BARIATRICS | Facility: CLINIC | Age: 61
End: 2021-12-01
Payer: COMMERCIAL

## 2021-12-07 ENCOUNTER — TELEPHONE (OUTPATIENT)
Dept: BARIATRICS | Facility: CLINIC | Age: 61
End: 2021-12-07
Payer: COMMERCIAL

## 2022-02-02 ENCOUNTER — TELEPHONE (OUTPATIENT)
Dept: BARIATRICS | Facility: CLINIC | Age: 62
End: 2022-02-02
Payer: COMMERCIAL

## 2022-02-02 NOTE — TELEPHONE ENCOUNTER
Patient cancelled appointment for medical weight loss in December.  Called patient to confirm that she opted not to proceed with medical wt loss.  Patient stated that due to her insurance, that she decided not to proceed with medical wt loss and is not interested in surgical wt loss.  Informed patient that if insurance or circumstances changes, to call back to reschedule.  Pt verbalized understanding

## 2022-02-02 NOTE — TELEPHONE ENCOUNTER
----- Message from Fanta Del Cid MD sent at 12/8/2021 11:28 AM CST -----  Regarding: medications  [employer] covers Wegovy sometimes, so she can inquire about that medicine.  Ozempic will likey only be covered with a diagnosis of diabetes, not borderline or prediabetes.  Otherwise, her only options would be metformin or topiramate, neither of which are effective as Wegovy or Ozempic.  ----- Message -----  From: Deidre Junior RN  Sent: 12/8/2021  11:11 AM CST  To: MD Dr. Nehemias Ling, pt is self pay - pt is concerned about the cost of medications.  She said that she is borderline diabetic- can you review to see what possible medications may be prescribed so that the pt can check her prescription pharmacy list for coverage?  She has a pt on 12/15, but does not want to come in if she will not be able to afford the medications.  Thanks!

## 2022-05-17 ENCOUNTER — CLINICAL SUPPORT (OUTPATIENT)
Dept: SMOKING CESSATION | Facility: CLINIC | Age: 62
End: 2022-05-17
Payer: COMMERCIAL

## 2022-05-17 DIAGNOSIS — F17.210 MODERATE CIGARETTE SMOKER (10-19 PER DAY): Primary | ICD-10-CM

## 2022-05-17 PROCEDURE — 99404 PREV MED CNSL INDIV APPRX 60: CPT | Mod: 95,,,

## 2022-05-17 PROCEDURE — 99404 PR PREVENT COUNSEL,INDIV,60 MIN: ICD-10-PCS | Mod: 95,,,

## 2022-05-17 RX ORDER — IBUPROFEN 200 MG
1 TABLET ORAL DAILY
Qty: 28 PATCH | Refills: 0 | Status: SHIPPED | OUTPATIENT
Start: 2022-05-17 | End: 2023-03-16

## 2022-05-17 RX ORDER — DM/P-EPHED/ACETAMINOPH/DOXYLAM 30-7.5/3
2 LIQUID (ML) ORAL
Qty: 216 LOZENGE | Refills: 0 | Status: SHIPPED | OUTPATIENT
Start: 2022-05-17 | End: 2022-05-17

## 2022-05-17 RX ORDER — DM/P-EPHED/ACETAMINOPH/DOXYLAM 30-7.5/3
2 LIQUID (ML) ORAL
Qty: 216 LOZENGE | Refills: 0 | Status: SHIPPED | OUTPATIENT
Start: 2022-05-17 | End: 2022-07-14 | Stop reason: SDUPTHER

## 2022-05-17 NOTE — Clinical Note
Patient was seen for tobacco cessation intake.  Patient will begin on 21 mg nicotine patch and 2 mg nicotine lozenge.  Patient has agreed to bi weekly follow up.

## 2022-06-09 ENCOUNTER — CLINICAL SUPPORT (OUTPATIENT)
Dept: SMOKING CESSATION | Facility: CLINIC | Age: 62
End: 2022-06-09
Payer: COMMERCIAL

## 2022-06-09 DIAGNOSIS — F17.210 LIGHT CIGARETTE SMOKER (1-9 CIGS/DAY): Primary | ICD-10-CM

## 2022-06-09 PROCEDURE — 99404 PR PREVENT COUNSEL,INDIV,60 MIN: ICD-10-PCS | Mod: 95,,,

## 2022-06-09 PROCEDURE — 99404 PREV MED CNSL INDIV APPRX 60: CPT | Mod: 95,,,

## 2022-06-09 NOTE — Clinical Note
Patient is smoking less than 10 cpd.  Patient reports going a full day with none. Patient remains on prescribed tobacco cessation medication regimen of 21 mg patch without any negative side effects at this time. Patient was commended on her success thus far.  We discussed cues, triggers, reasons and benefits of quitting.  We discussed tobacco cessation strategies and timeline to quitting.      The patient denies any abnormal behavioral or mental changes at this time. The patient will continue with  therapy sessions and medication monitoring by CTTS. Prescribed medication management will be by physician.

## 2022-06-09 NOTE — PROGRESS NOTES
Individual Follow-Up Form    6/9/2022    Quit Date:     Clinical Status of Patient: Outpatient    Continuing Medication: yes  Patches    Other Medications: nicotne lozenge      Target Symptoms: Withdrawal and medication side effects. The following were  rated moderate (3) to severe (4) on TCRS:  · Moderate (3): none  · Severe (4): none    Comments: Patient is smoking less than 10 cpd.  Patient reports going a full day with none. Patient remains on prescribed tobacco cessation medication regimen of 21 mg patch without any negative side effects at this time. Patient was commended on her success thus far.  We discussed cues, triggers, reasons and benefits of quitting.  We discussed tobacco cessation strategies and timeline to quitting.      The patient denies any abnormal behavioral or mental changes at this time. The patient will continue with  therapy sessions and medication monitoring by CTTS. Prescribed medication management will be by physician.    Diagnosis: F17.210    Next Visit: 2 weeks

## 2022-06-15 ENCOUNTER — OFFICE VISIT (OUTPATIENT)
Dept: PRIMARY CARE CLINIC | Facility: CLINIC | Age: 62
End: 2022-06-15
Payer: COMMERCIAL

## 2022-06-15 ENCOUNTER — TELEPHONE (OUTPATIENT)
Dept: PRIMARY CARE CLINIC | Facility: CLINIC | Age: 62
End: 2022-06-15
Payer: COMMERCIAL

## 2022-06-15 VITALS
HEIGHT: 63 IN | SYSTOLIC BLOOD PRESSURE: 118 MMHG | BODY MASS INDEX: 41.62 KG/M2 | DIASTOLIC BLOOD PRESSURE: 76 MMHG | WEIGHT: 234.88 LBS | RESPIRATION RATE: 16 BRPM | OXYGEN SATURATION: 98 % | HEART RATE: 66 BPM

## 2022-06-15 DIAGNOSIS — R73.03 PREDIABETES: ICD-10-CM

## 2022-06-15 DIAGNOSIS — Z11.59 NEED FOR HEPATITIS C SCREENING TEST: ICD-10-CM

## 2022-06-15 DIAGNOSIS — E66.01 CLASS 3 SEVERE OBESITY WITH BODY MASS INDEX (BMI) OF 40.0 TO 44.9 IN ADULT, UNSPECIFIED OBESITY TYPE, UNSPECIFIED WHETHER SERIOUS COMORBIDITY PRESENT: ICD-10-CM

## 2022-06-15 DIAGNOSIS — Z86.79: ICD-10-CM

## 2022-06-15 DIAGNOSIS — J30.2 SEASONAL ALLERGIC RHINITIS, UNSPECIFIED TRIGGER: Primary | ICD-10-CM

## 2022-06-15 PROCEDURE — 99999 PR PBB SHADOW E&M-EST. PATIENT-LVL V: ICD-10-PCS | Mod: PBBFAC,,, | Performed by: INTERNAL MEDICINE

## 2022-06-15 PROCEDURE — 99204 PR OFFICE/OUTPT VISIT, NEW, LEVL IV, 45-59 MIN: ICD-10-PCS | Mod: S$GLB,,, | Performed by: INTERNAL MEDICINE

## 2022-06-15 PROCEDURE — 99999 PR PBB SHADOW E&M-EST. PATIENT-LVL V: CPT | Mod: PBBFAC,,, | Performed by: INTERNAL MEDICINE

## 2022-06-15 PROCEDURE — 99204 OFFICE O/P NEW MOD 45 MIN: CPT | Mod: S$GLB,,, | Performed by: INTERNAL MEDICINE

## 2022-06-15 RX ORDER — LEVOCETIRIZINE DIHYDROCHLORIDE 5 MG/1
5 TABLET, FILM COATED ORAL NIGHTLY PRN
Qty: 30 TABLET | Refills: 5 | Status: SHIPPED | OUTPATIENT
Start: 2022-06-15 | End: 2023-06-13

## 2022-06-15 RX ORDER — SEMAGLUTIDE 1.34 MG/ML
INJECTION, SOLUTION SUBCUTANEOUS
Refills: 0 | OUTPATIENT
Start: 2022-06-15

## 2022-06-15 NOTE — TELEPHONE ENCOUNTER
----- Message from Keyona Aguilar sent at 6/15/2022 12:38 PM CDT -----  Contact: [phone]  Please f/u with pt in regards to Rx that was suppose to be sent to pharmacy

## 2022-06-15 NOTE — PROGRESS NOTES
Subjective:       Patient ID: Lulú Rosenthal is a 61 y.o. female.    Chief Complaint: Annual Exam    HPI  Pt visit today with c/o f/u she is overweigh and f/h DM he dad  from DM mom has HTN pt had increase in glucose before [facility] she denies sob cp [facility] nocturi ahematuria . Pt also c/o allergy that happened twice a yr taking zyrtec not helping she also c/o knee pain but ok now prob OA  Review of Systems    Objective:      Physical Exam  Vitals and nursing note reviewed.   Constitutional:       General: She is not in acute distress.     Appearance: She is well-developed. She is obese.   HENT:      Head: Normocephalic and atraumatic.      Right Ear: External ear normal.      Left Ear: External ear normal.      Nose: Nose normal.      Mouth/Throat:      Pharynx: No oropharyngeal exudate.   Eyes:      Extraocular Movements: Extraocular movements intact.      Conjunctiva/sclera: Conjunctivae normal.      Pupils: Pupils are equal, round, and reactive to light.   Neck:      Thyroid: No thyromegaly.   Cardiovascular:      Rate and Rhythm: Normal rate and regular rhythm.      Heart sounds: Normal heart sounds. No murmur heard.    No friction rub. No gallop.   Pulmonary:      Effort: Pulmonary effort is normal. No respiratory distress.      Breath sounds: Normal breath sounds. No wheezing.   Abdominal:      General: Bowel sounds are normal. There is no distension.      Palpations: Abdomen is soft.      Tenderness: There is no abdominal tenderness.   Musculoskeletal:         General: No tenderness or deformity. Normal range of motion.      Cervical back: Normal range of motion and neck supple.   Lymphadenopathy:      Cervical: No cervical adenopathy.   Skin:     General: Skin is warm and dry.      Findings: No erythema or rash.   Neurological:      Mental Status: She is alert and oriented to person, place, and time.   Psychiatric:         Mood and Affect: Mood normal.         Thought Content: Thought  content normal.         Judgment: Judgment normal.         Assessment:       1. Seasonal allergic rhinitis, unspecified trigger    2. H/O spont intraventricular hemorrhage due to cerebral aneurysm    3. Prediabetes    4. Class 3 severe obesity with body mass index (BMI) of 40.0 to 44.9 in adult, unspecified obesity type, unspecified whether serious comorbidity present    5. Need for hepatitis C screening test        Plan:       Seasonal allergic rhinitis, unspecified trigger  -     levocetirizine (XYZAL) 5 MG tablet; Take 1 tablet (5 mg total) by mouth nightly as needed for Allergies.  Dispense: 30 tablet; Refill: 5    H/O spont intraventricular hemorrhage due to cerebral aneurysm  -     CT Head Without Contrast; Future; Expected date: 06/15/2022    Prediabetes  -     semaglutide (OZEMPIC) 1 mg/dose (4 mg/3 mL); Inject 1 mg into the skin every 7 days.  Dispense: 1 pen; Refill: 5  -     Basic Metabolic Panel; Future; Expected date: 09/15/2022  -     Hemoglobin A1C; Future; Expected date: 09/15/2022  -     Microalbumin/Creatinine Ratio, Urine; Future; Expected date: 09/15/2022  -     Lipid Panel; Future; Expected date: 09/15/2022  -     Urinalysis; Future; Expected date: 06/15/2022    Class 3 severe obesity with body mass index (BMI) of 40.0 to 44.9 in adult, unspecified obesity type, unspecified whether serious comorbidity present  -     semaglutide (OZEMPIC) 1 mg/dose (4 mg/3 mL); Inject 1 mg into the skin every 7 days.  Dispense: 1 pen; Refill: 5    Need for hepatitis C screening test  -     HIV 1/2 Ag/Ab (4th Gen); Future; Expected date: 06/15/2022        Medication List with Changes/Refills   New Medications    LEVOCETIRIZINE (XYZAL) 5 MG TABLET    Take 1 tablet (5 mg total) by mouth nightly as needed for Allergies.    SEMAGLUTIDE (OZEMPIC) 1 MG/DOSE (4 MG/3 ML)    Inject 1 mg into the skin every 7 days.   Current Medications    CETIRIZINE (ZYRTEC) 10 MG TABLET    Take 10 mg by mouth once daily.     HYDROCHLOROTHIAZIDE (HYDRODIURIL) 25 MG TABLET    Take 1 tablet (25 mg total) by mouth once daily. 1 tablet    IBUPROFEN (ADVIL,MOTRIN) 400 MG TABLET    Take 1 tablet (400 mg total) by mouth every 6 (six) hours as needed.    LORATADINE (CLARITIN) 10 MG TABLET    Take 1 tablet (10 mg total) by mouth once daily.    LOSARTAN (COZAAR) 50 MG TABLET    Take 1 tablet (50 mg total) by mouth once daily.    MONTELUKAST (SINGULAIR) 10 MG TABLET    One tablet by mouth every evening    NAPROXEN SODIUM (ANAPROX) 550 MG TABLET    TAKE 1 TABLET (550 MG TOTAL) BY MOUTH 2 (TWO) TIMES DAILY WITH MEALS.    NICOTINE (NICODERM CQ) 21 MG/24 HR    Place 1 patch onto the skin once daily.    NICOTINE POLACRILEX 2 MG LOZG    Take 1 lozenge (2 mg total) by mouth as needed (Use in place of cigarettes/Do not exceed 4 pieces a day).    OLOPATADINE (PATANOL) 0.1 % OPHTHALMIC SOLUTION    Place 1 drop into both eyes 2 (two) times daily.    POTASSIUM CHLORIDE SA (K-DUR,KLOR-CON) 20 MEQ TABLET    Take 1 tablet (20 mEq total) by mouth 2 (two) times daily.    TETRAHYDROZ--HYPROM-GLY 0.05-1-0.36-0.2 % DROP    1 drop into affected eye as needed    TIZANIDINE (ZANAFLEX) 4 MG TABLET    Take 1 tablet (4 mg total) by mouth 2 (two) times daily as needed.    TRIAMCINOLONE ACETONIDE 0.5% (KENALOG) 0.5 % CREA    APPLY TO RASH ON FACE TWICE DAILY,. AFTER USING FOR 4 DAYS. DO NOT RESTART USING FOR 1 WEEK    ZOLPIDEM (AMBIEN) 10 MG TAB    Take 1 tablet (10 mg total) by mouth as needed.

## 2022-06-15 NOTE — TELEPHONE ENCOUNTER
Care Due:                  Date            Visit Type   Department     Provider  --------------------------------------------------------------------------------                                NP -                              PRIMARY      [facility]  Last Visit: 06-      CARE ([facility])   PRIMARY CARE   Ran Asencio                               -                              PRIMARY      [facility]  Next Visit: 09-      CARE ([facility])   PRIMARY CARE   Ran Asencio                                                            Last  Test          Frequency    Reason                     Performed    Due Date  --------------------------------------------------------------------------------    HBA1C.......  6 months...  semaglutide..............  Not Found    Overdue    [employer] Embedded Care Gaps. Reference number: [id number]. 6/15/2022   6:29:02 PM CDT

## 2022-06-15 NOTE — LETTER
Ivory 15, 2022      [facility] [address], [facility] [address]  [place] [address]  Phone: [phone]  Fax: [phone]       Patient: Lulú Rosenthal   YOB: 1960  Date of Visit: 06/15/2022    To Whom It May Concern:    Ruddy Rosenthal  was at [facility] on 06/15/2022. The patient may return to work on 06/16/2022 with no restrictions. If you have any questions or concerns, or if I can be of further assistance, please do not hesitate to contact me.    Sincerely,      Jane Mohr LPN

## 2022-06-16 NOTE — TELEPHONE ENCOUNTER
[facility]. Inappropriate Request    Refill Authorization Note   Lulú Rosenthal  is requesting a refill authorization.  Brief Assessment and Rationale for Refill:  Quick Discontinue  Medication Therapy Plan:    Pharmacy is requesting new scripts for the following medications without required information, (sig/ frequency/qty/etc)       Medication Reconciliation Completed:  No      Comments: Pharmacies have been requesting medications for patients without required information, (sig, frequency, qty, etc.). In addition, requests are sent for medication(s) pt. are currently not taking, and medications patients have never taken.    We have spoken to the pharmacies about these request types and advised their teams previously that we are unable to assess these New Script requests and require all details for these requests. This is a known issue and has been reported.        Note composed:7:43 PM 06/15/2022

## 2022-06-21 ENCOUNTER — TELEPHONE (OUTPATIENT)
Dept: PRIMARY CARE CLINIC | Facility: CLINIC | Age: 62
End: 2022-06-21
Payer: COMMERCIAL

## 2022-06-21 DIAGNOSIS — R73.03 PREDIABETES: ICD-10-CM

## 2022-06-21 DIAGNOSIS — E66.01 CLASS 3 SEVERE OBESITY WITH BODY MASS INDEX (BMI) OF 40.0 TO 44.9 IN ADULT, UNSPECIFIED OBESITY TYPE, UNSPECIFIED WHETHER SERIOUS COMORBIDITY PRESENT: ICD-10-CM

## 2022-06-21 RX ORDER — SEMAGLUTIDE 1.34 MG/ML
INJECTION, SOLUTION SUBCUTANEOUS
Refills: 0 | OUTPATIENT
Start: 2022-06-21

## 2022-06-21 NOTE — TELEPHONE ENCOUNTER
Spoke with [facility] representative (Sayda) gave the verbal okay for the pt. To receive this rx via home delivery     Ozempic 1 mg to inject into the skin once per week.     DISP 3 pens with 1 refill pt. Will need a 6 month f/u to determine medication is effective

## 2022-06-21 NOTE — TELEPHONE ENCOUNTER
[facility] Note  Quick DC. Inappropriate Request   Refill request requires further review by MD: NO   Medication Therapy Plan: Pharmacy is requesting new script(s) for the following medications without required information, (sig/ frequency/qty/etc)     [facility] action(s):  Quick Discontinue      Duplicate Pended Encounter(s)/ Last Prescribed Details:    Pharmacies have been requesting medications for patients without required information, (sig, frequency, qty, etc.). In addition, requests are sent for medication(s) pt. are currently not taking, and medications patients have never taken.    We have spoken to the pharmacies about these request types and advised their teams previously that we are unable to assess these New Script requests and require all details for these requests. This is a known issue and has been reported.        Medication related problems are not assessed for [facility].   Medication Reconciliation Completed? NO Were there pending details that required adjustment? NO     Automatic Epic Generated Protocol Data Below:   Requested Prescriptions   Pending Prescriptions Disp Refills    semaglutide (OZEMPIC) 1 mg/dose (4 mg/3 mL) [Pharmacy Med Name: OZEMPIC (1 MG/DOSE) SOLN PEN-INJ 4MG/3ML]  0              Appointments      Date Provider   Last Visit   6/15/2022 Ran Asencio MD   Next Visit   9/15/2022 Ran Asencio MD        Note composed:8:15 AM 06/21/2022

## 2022-06-21 NOTE — TELEPHONE ENCOUNTER
----- Message from Emilie Duarte sent at 6/21/2022  8:17 AM CDT -----  Contact: self [phone]  Requesting an RX refill or new RX.  Is this a refill or new RX:   RX name and strength (copy/paste from chart):  semaglutide (OZEMPIC) 1 mg/dose (4 mg/3 mL)  Is this a 30 day or 90 day RX: 90  Pharmacy name and phone # (copy/paste from chart):    [facility] - [address] [address]  Phone: [phone] Fax: [phone]      The doctors have asked that we provide their patients with the following 2 reminders -- prescription refills can take up to 72 hours, and a friendly reminder that in the future you can use your [facility] account to request refills:yes.    the patient stated need 90 day supply of med it is cheaper stated [facility] sent an email.    Please call and advise

## 2022-06-21 NOTE — TELEPHONE ENCOUNTER
No new care gaps identified.  [facility] Embedded Care Gaps. Reference number: [id number]. 6/21/2022   8:14:16 AM CDT

## 2022-06-30 ENCOUNTER — CLINICAL SUPPORT (OUTPATIENT)
Dept: SMOKING CESSATION | Facility: CLINIC | Age: 62
End: 2022-06-30
Payer: COMMERCIAL

## 2022-06-30 DIAGNOSIS — F17.210 LIGHT CIGARETTE SMOKER (1-9 CIGS/DAY): Primary | ICD-10-CM

## 2022-06-30 PROCEDURE — 99404 PR PREVENT COUNSEL,INDIV,60 MIN: ICD-10-PCS | Mod: 95,,,

## 2022-06-30 PROCEDURE — 99404 PREV MED CNSL INDIV APPRX 60: CPT | Mod: 95,,,

## 2022-06-30 NOTE — PROGRESS NOTES
"Individual Follow-Up Form    6/30/2022    Quit Date:     Clinical Status of Patient: Outpatient    Continuing Medication: yes  Patches    Other Medications: nicotine lozenge      Target Symptoms: Withdrawal and medication side effects. The following were  rated moderate (3) to severe (4) on TCRS:  · Moderate (3): none  · Severe (4): none    Comments: Patient reports "falling of the wagon" over last weekend.  Patient reports of the last few days being back on track down to 4 cpd or less.  Patient remains on prescribed tobacco cessation medication regimen of 21 mg patch without any negative side effects at this time. Completion of TCRS (Tobacco Cessation Rating Scale) reviewed strategies, cues, and triggers. Introduced the negative impact of tobacco on health, the health advantages of discontinuing the use of tobacco, time line improved health changes after a quit, withdrawal issues to expect from nicotine and habit, and ways to achieve the goal of a quit. The patient denies any abnormal behavioral or mental changes at this time. The patient will continue with  therapy sessions and medication monitoring by CTTS. Prescribed medication management will be by physician.        Diagnosis: F17.210    Next Visit: 2 weeks    "

## 2022-07-14 ENCOUNTER — CLINICAL SUPPORT (OUTPATIENT)
Dept: SMOKING CESSATION | Facility: CLINIC | Age: 62
End: 2022-07-14
Payer: COMMERCIAL

## 2022-07-14 DIAGNOSIS — F17.210 MODERATE CIGARETTE SMOKER (10-19 PER DAY): ICD-10-CM

## 2022-07-14 DIAGNOSIS — F17.200 NICOTINE DEPENDENCE: Primary | ICD-10-CM

## 2022-07-14 PROCEDURE — 99402 PREV MED CNSL INDIV APPRX 30: CPT | Mod: 95,,,

## 2022-07-14 PROCEDURE — 99402 PR PREVENT COUNSEL,INDIV,30 MIN: ICD-10-PCS | Mod: 95,,,

## 2022-07-14 RX ORDER — DM/P-EPHED/ACETAMINOPH/DOXYLAM 30-7.5/3
2 LIQUID (ML) ORAL
Qty: 108 LOZENGE | Refills: 0 | Status: SHIPPED | OUTPATIENT
Start: 2022-07-14 | End: 2022-09-29 | Stop reason: SDUPTHER

## 2022-07-14 RX ORDER — IBUPROFEN 200 MG
1 TABLET ORAL DAILY
Qty: 28 PATCH | Refills: 0 | Status: SHIPPED | OUTPATIENT
Start: 2022-07-14 | End: 2022-09-29 | Stop reason: SDUPTHER

## 2022-07-14 NOTE — PROGRESS NOTES
The patient location is: [facility]  The chief complaint leading to consultation is: Nicotine Dependence    Visit type: audiovisual    Face to Face time with patient: 30 minutes of total time spent on the encounter, which includes face to face time and non-face to face time preparing to see the patient (eg, review of tests), Obtaining and/or reviewing separately obtained history, Documenting clinical information in the electronic or other health record, Independently interpreting results (not separately reported) and communicating results to the patient/family/caregiver, or Care coordination (not separately reported).         Each patient to whom he or she provides medical services by telemedicine is:  (1) informed of the relationship between the physician and patient and the respective role of any other health care provider with respect to management of the patient; and (2) notified that he or she may decline to receive medical services by telemedicine and may withdraw from such care at any time.    Notes:   Individual Follow-Up Form    7/14/2022    Quit Date:     Clinical Status of Patient: Outpatient        Continuing Medication: yes  Patches    Other Medications: 2 mg nicotine lozenges     Target Symptoms: Withdrawal and medication side effects. The following were  rated moderate (3) to severe (4) on TCRS:  · Moderate (3): none  · Severe (4): none    Comments: Patient states smoking 2-3 cpd. Patient will begin the prescribed tobacco cessation medication regimen of 14 mg patch, order sent to pharmacy. The patient denies any abnormal behavioral or mental changes at this time. Encouraged patient to look towards a quit date along with other strategies discussed to achieve a quit. The patient will continue with  therapy sessions and medication monitoring by CTTS. Prescribed medication management will be by physician.           Diagnosis: F17.200    Next Visit: 2 weeks

## 2022-07-14 NOTE — Clinical Note
Patient states smoking 2-3 cpd. Patient will begin the prescribed tobacco cessation medication regimen of 14 mg patch, order sent to pharmacy. The patient denies any abnormal behavioral or mental changes at this time. Encouraged patient to look towards a quit date along with other strategies discussed to achieve a quit. The patient will continue with  therapy sessions and medication monitoring by CTTS. Prescribed medication management will be by physician.

## 2022-08-04 ENCOUNTER — CLINICAL SUPPORT (OUTPATIENT)
Dept: SMOKING CESSATION | Facility: CLINIC | Age: 62
End: 2022-08-04
Payer: COMMERCIAL

## 2022-08-04 DIAGNOSIS — F17.200 NICOTINE DEPENDENCE: Primary | ICD-10-CM

## 2022-08-04 PROCEDURE — 99402 PREV MED CNSL INDIV APPRX 30: CPT | Mod: 95,,,

## 2022-08-04 PROCEDURE — 99402 PR PREVENT COUNSEL,INDIV,30 MIN: ICD-10-PCS | Mod: 95,,,

## 2022-08-04 NOTE — PROGRESS NOTES
The patient location is: [facility]  The chief complaint leading to consultation is: Nicotine Dependence    Visit type: audiovisual    Face to Face time with patient: 31 minutes of total time spent on the encounter, which includes face to face time and non-face to face time preparing to see the patient (eg, review of tests), Obtaining and/or reviewing separately obtained history, Documenting clinical information in the electronic or other health record, Independently interpreting results (not separately reported) and communicating results to the patient/family/caregiver, or Care coordination (not separately reported).         Each patient to whom he or she provides medical services by telemedicine is:  (1) informed of the relationship between the physician and patient and the respective role of any other health care provider with respect to management of the patient; and (2) notified that he or she may decline to receive medical services by telemedicine and may withdraw from such care at any time.    Notes: Individual Follow-Up Form    8/4/2022    Quit Date:     Clinical Status of Patient: Outpatient    Continuing Medication: yes  Patches    Other Medications: 2 mg lozenges PRN     Target Symptoms: Withdrawal and medication side effects. The following were  rated moderate (3) to severe (4) on TCRS:  · Moderate (3): none  · Severe (4): none    Comments: Patient states she has been off track and smoked more than usual. The patient remains on the prescribed tobacco cessation medication regimen of 14 mg patch without any negative side effects at this time. The patient denies any abnormal behavioral or mental changes at this time. No refill needed at this time. Patient states she has had a lot of stress for the last few days, but ready to get back on track. Discussed and reviewed strategies, habitual behavior, stress, and high risk situations. Introduced stress with addition interventions, relaxation with interventions, and  the importance of rewarding oneself for accomplishments toward becoming tobacco free. The patient will continue with  therapy sessions and medication monitoring by CTTS. Prescribed medication management will be by physician.      Diagnosis: F17.200    Next Visit: 8/25/2022

## 2022-08-04 NOTE — Clinical Note
Patient states she has been off track and smoked more than usual. The patient remains on the prescribed tobacco cessation medication regimen of 14 mg patch without any negative side effects at this time. The patient denies any abnormal behavioral or mental changes at this time. No refill needed at this time. Patient states she has had a lot of stress for the last few days, but ready to get back on track. Discussed and reviewed strategies, habitual behavior, stress, and high risk situations. Introduced stress with addition interventions, relaxation with interventions, and the importance of rewarding oneself for accomplishments toward becoming tobacco free. The patient will continue with  therapy sessions and medication monitoring by CTTS. Prescribed medication management will be by physician.  To return home

## 2022-08-25 ENCOUNTER — CLINICAL SUPPORT (OUTPATIENT)
Dept: SMOKING CESSATION | Facility: CLINIC | Age: 62
End: 2022-08-25
Payer: COMMERCIAL

## 2022-08-25 DIAGNOSIS — F17.200 NICOTINE DEPENDENCE: Primary | ICD-10-CM

## 2022-08-25 PROCEDURE — 99404 PREV MED CNSL INDIV APPRX 60: CPT | Mod: 95,,,

## 2022-08-25 PROCEDURE — 99404 PR PREVENT COUNSEL,INDIV,60 MIN: ICD-10-PCS | Mod: 95,,,

## 2022-08-25 NOTE — Clinical Note
Patient states she is smoking about the same as the last visit due to a stressful last couple of weeks.  Pt remains on the prescribed tobacco cessation medication regimen of 14 mg nicotine patch and 2 mg lozenges PRN(in place of a cigarette) without any negative side effects at this time. No refill needed at this time. The patient denies any abnormal behavioral or mental changes at this time. Discussed and reviewed strategies, habitual behavior, high risks situations, understanding urges and cravings, stress and relaxation with open discussion and additional interventions, Introduced lapses, relapses, understanding them and analyzing the situation of a lapse, conflict issues that may be linked to a lapse. Patient is preparing for her son's wedding out of town and still being able to maintain her quit journey. The patient will continue with  therapy sessions and medication monitoring by CTTS. Prescribed medication management will be by physician.

## 2022-08-25 NOTE — PROGRESS NOTES
The patient location is: [facility]  The chief complaint leading to consultation is: Nicotine Dependence    Visit type: audiovisual    Face to Face time with patient: 62 minutes of total time spent on the encounter, which includes face to face time and non-face to face time preparing to see the patient (eg, review of tests), Obtaining and/or reviewing separately obtained history, Documenting clinical information in the electronic or other health record, Independently interpreting results (not separately reported) and communicating results to the patient/family/caregiver, or Care coordination (not separately reported).         Each patient to whom he or she provides medical services by telemedicine is:  (1) informed of the relationship between the physician and patient and the respective role of any other health care provider with respect to management of the patient; and (2) notified that he or she may decline to receive medical services by telemedicine and may withdraw from such care at any time.    Notes: Individual Follow-Up Form    8/25/2022    Quit Date:     Clinical Status of Patient: Outpatient    Continuing Medication: yes  Patches    Other Medications: 2 mg nicotine lozenges PRN     Target Symptoms: Withdrawal and medication side effects. The following were  rated moderate (3) to severe (4) on TCRS:  · Moderate (3): none  · Severe (4): none    Comments: Patient states she is smoking about the same as the last visit due to a stressful last couple of weeks.  Pt remains on the prescribed tobacco cessation medication regimen of 14 mg nicotine patch and 2 mg lozenges PRN(in place of a cigarette) without any negative side effects at this time. No refill needed at this time. The patient denies any abnormal behavioral or mental changes at this time. Discussed and reviewed strategies, habitual behavior, high risks situations, understanding urges and cravings, stress and relaxation with open discussion and additional  interventions, Introduced lapses, relapses, understanding them and analyzing the situation of a lapse, conflict issues that may be linked to a lapse. Patient is preparing for her son's wedding out of town and still being able to maintain her quit journey. The patient will continue with  therapy sessions and medication monitoring by CTTS. Prescribed medication management will be by physician.           Diagnosis: F17.200    Next Visit: 9/15/2022

## 2022-09-09 ENCOUNTER — TELEPHONE (OUTPATIENT)
Dept: PRIMARY CARE CLINIC | Facility: CLINIC | Age: 62
End: 2022-09-09
Payer: COMMERCIAL

## 2022-09-09 DIAGNOSIS — N30.00 ACUTE CYSTITIS WITHOUT HEMATURIA: Primary | ICD-10-CM

## 2022-09-09 RX ORDER — CIPROFLOXACIN 250 MG/1
250 TABLET, FILM COATED ORAL 2 TIMES DAILY
Qty: 14 TABLET | Refills: 0 | Status: SHIPPED | OUTPATIENT
Start: 2022-09-09 | End: 2022-09-15

## 2022-09-09 NOTE — TELEPHONE ENCOUNTER
----- Message from Ran Asencio MD sent at 9/9/2022  5:29 AM CDT -----  Please call the patient regarding her labs are normal   Tried to reach pt several times, called pts mother and asked her to have pt call the office for her labs.  It has been over a month since pt was in for labs.

## 2022-09-09 NOTE — TELEPHONE ENCOUNTER
Notified patient of urine and lab results . Patient was concerned about the positive nitrates in her urine and wants to know if an antibiotic can be sent to [facility] in [place].

## 2022-09-15 ENCOUNTER — OFFICE VISIT (OUTPATIENT)
Dept: PRIMARY CARE CLINIC | Facility: CLINIC | Age: 62
End: 2022-09-15
Payer: COMMERCIAL

## 2022-09-15 ENCOUNTER — CLINICAL SUPPORT (OUTPATIENT)
Dept: SMOKING CESSATION | Facility: CLINIC | Age: 62
End: 2022-09-15
Payer: COMMERCIAL

## 2022-09-15 VITALS
DIASTOLIC BLOOD PRESSURE: 78 MMHG | HEART RATE: 86 BPM | SYSTOLIC BLOOD PRESSURE: 122 MMHG | WEIGHT: 218.56 LBS | HEIGHT: 63 IN | BODY MASS INDEX: 38.73 KG/M2 | OXYGEN SATURATION: 97 % | RESPIRATION RATE: 18 BRPM

## 2022-09-15 DIAGNOSIS — G47.00 INSOMNIA, UNSPECIFIED TYPE: ICD-10-CM

## 2022-09-15 DIAGNOSIS — F17.200 NICOTINE DEPENDENCE: Primary | ICD-10-CM

## 2022-09-15 DIAGNOSIS — N30.00 ACUTE CYSTITIS WITHOUT HEMATURIA: Primary | ICD-10-CM

## 2022-09-15 DIAGNOSIS — Z12.31 ENCOUNTER FOR SCREENING MAMMOGRAM FOR BREAST CANCER: ICD-10-CM

## 2022-09-15 DIAGNOSIS — M54.50 CHRONIC MIDLINE LOW BACK PAIN: ICD-10-CM

## 2022-09-15 DIAGNOSIS — Z23 ENCOUNTER FOR VACCINATION: ICD-10-CM

## 2022-09-15 DIAGNOSIS — G89.29 CHRONIC MIDLINE LOW BACK PAIN: ICD-10-CM

## 2022-09-15 PROCEDURE — 99999 PR PBB SHADOW E&M-EST. PATIENT-LVL IV: ICD-10-PCS | Mod: PBBFAC,,, | Performed by: INTERNAL MEDICINE

## 2022-09-15 PROCEDURE — 90471 IMMUNIZATION ADMIN: CPT | Mod: S$GLB,,, | Performed by: INTERNAL MEDICINE

## 2022-09-15 PROCEDURE — 90471 FLU VACCINE (QUAD) GREATER THAN OR EQUAL TO 3YO PRESERVATIVE FREE IM: ICD-10-PCS | Mod: S$GLB,,, | Performed by: INTERNAL MEDICINE

## 2022-09-15 PROCEDURE — 99213 PR OFFICE/OUTPT VISIT, EST, LEVL III, 20-29 MIN: ICD-10-PCS | Mod: 25,S$GLB,, | Performed by: INTERNAL MEDICINE

## 2022-09-15 PROCEDURE — 99999 PR PBB SHADOW E&M-EST. PATIENT-LVL IV: CPT | Mod: PBBFAC,,, | Performed by: INTERNAL MEDICINE

## 2022-09-15 PROCEDURE — 99402 PR PREVENT COUNSEL,INDIV,30 MIN: ICD-10-PCS | Mod: 95,,,

## 2022-09-15 PROCEDURE — 99402 PREV MED CNSL INDIV APPRX 30: CPT | Mod: 95,,,

## 2022-09-15 PROCEDURE — 90686 IIV4 VACC NO PRSV 0.5 ML IM: CPT | Mod: S$GLB,,, | Performed by: INTERNAL MEDICINE

## 2022-09-15 PROCEDURE — 99213 OFFICE O/P EST LOW 20 MIN: CPT | Mod: 25,S$GLB,, | Performed by: INTERNAL MEDICINE

## 2022-09-15 PROCEDURE — 90686 FLU VACCINE (QUAD) GREATER THAN OR EQUAL TO 3YO PRESERVATIVE FREE IM: ICD-10-PCS | Mod: S$GLB,,, | Performed by: INTERNAL MEDICINE

## 2022-09-15 RX ORDER — ZOLPIDEM TARTRATE 10 MG/1
10 TABLET ORAL
Qty: 30 TABLET | Refills: 1 | Status: SHIPPED | OUTPATIENT
Start: 2022-09-15 | End: 2023-06-16 | Stop reason: SDUPTHER

## 2022-09-15 RX ORDER — TIZANIDINE 4 MG/1
4 TABLET ORAL 2 TIMES DAILY PRN
Qty: 60 TABLET | Refills: 1 | Status: SHIPPED | OUTPATIENT
Start: 2022-09-15 | End: 2023-06-16 | Stop reason: SDUPTHER

## 2022-09-15 RX ORDER — ETODOLAC 400 MG/1
400 TABLET, FILM COATED ORAL 2 TIMES DAILY PRN
COMMUNITY
Start: 2022-08-29 | End: 2023-03-16

## 2022-09-15 RX ORDER — NITROFURANTOIN 25; 75 MG/1; MG/1
100 CAPSULE ORAL 2 TIMES DAILY
Qty: 14 CAPSULE | Refills: 1 | Status: ON HOLD | OUTPATIENT
Start: 2022-09-15 | End: 2023-04-26

## 2022-09-15 NOTE — PROGRESS NOTES
The patient location is: [facility]  The chief complaint leading to consultation is: Nicotine Dependence    Visit type: audiovisual    Face to Face time with patient: 21 minutes of total time spent on the encounter, which includes face to face time and non-face to face time preparing to see the patient (eg, review of tests), Obtaining and/or reviewing separately obtained history, Documenting clinical information in the electronic or other health record, Independently interpreting results (not separately reported) and communicating results to the patient/family/caregiver, or Care coordination (not separately reported).         Each patient to whom he or she provides medical services by telemedicine is:  (1) informed of the relationship between the physician and patient and the respective role of any other health care provider with respect to management of the patient; and (2) notified that he or she may decline to receive medical services by telemedicine and may withdraw from such care at any time.    Notes:  Individual Follow-Up Form    9/15/2022    Quit Date:     Clinical Status of Patient: Outpatient    Continuing Medication: yes  Patches    Other Medications: 2 mg nicotine PRN     Target Symptoms: Withdrawal and medication side effects. The following were  rated moderate (3) to severe (4) on TCRS:  Moderate (3): none  Severe (4): none    Comments: Patient states smoking about 1 ppd of cigarettes. Patient remains on prescribed tobacco cessation medication regimen of 14 mg patch and 2 mg lozenges PRN(in place of a cigarette)without any negative side effects at this time. The patient denies any abnormal behavioral or mental changes at this time. Encouraged patient to try a rate reduction of about 2-3 cigarettes for the next visit. The patient will continue with  therapy sessions and medication monitoring by CTTS. Prescribed medication management will be by physician.          Diagnosis: F17.200    Next Visit: 2 weeks

## 2022-09-15 NOTE — Clinical Note
Patient states smoking about 1 ppd of cigarettes. Patient remains on prescribed tobacco cessation medication regimen of 14 mg patch and 2 mg lozenges PRN(in place of a cigarette)without any negative side effects at this time. The patient denies any abnormal behavioral or mental changes at this time. Encouraged patient to try a rate reduction of about 2-3 cigarettes for the next visit. The patient will continue with  therapy sessions and medication monitoring by CTTS. Prescribed medication management will be by physician.

## 2022-09-15 NOTE — PROGRESS NOTES
Subjective:       Patient ID: Lulú Rosenthal is a 61 y.o. female.    Chief Complaint: Follow-up (3 month f/u)    HPI  Pt visit today for routine f/u and review labs results all normal she is c/o feeling tingling when she urinate and not feeling right no sob cp no n/v/d no fever chill back pain vag d/c .She denies any other symptoms   Review of Systems    Objective:      Physical Exam  Vitals and nursing note reviewed.   Constitutional:       General: She is not in acute distress.     Appearance: She is well-developed.   HENT:      Head: Normocephalic and atraumatic.      Right Ear: External ear normal.      Left Ear: External ear normal.      Nose: Nose normal.      Mouth/Throat:      Pharynx: No oropharyngeal exudate.   Eyes:      Extraocular Movements: Extraocular movements intact.      Conjunctiva/sclera: Conjunctivae normal.      Pupils: Pupils are equal, round, and reactive to light.   Neck:      Thyroid: No thyromegaly.   Cardiovascular:      Rate and Rhythm: Normal rate and regular rhythm.      Heart sounds: Normal heart sounds. No murmur heard.    No friction rub. No gallop.   Pulmonary:      Effort: Pulmonary effort is normal. No respiratory distress.      Breath sounds: Normal breath sounds. No wheezing.   Abdominal:      General: Bowel sounds are normal. There is no distension.      Palpations: Abdomen is soft.      Tenderness: There is no abdominal tenderness.   Musculoskeletal:         General: No tenderness or deformity. Normal range of motion.      Cervical back: Normal range of motion and neck supple.   Lymphadenopathy:      Cervical: No cervical adenopathy.   Skin:     General: Skin is warm and dry.   Neurological:      Mental Status: She is alert and oriented to person, place, and time.   Psychiatric:         Thought Content: Thought content normal.         Judgment: Judgment normal.       Assessment:       1. Acute cystitis without hematuria    2. Insomnia, unspecified type    3. Chronic  midline low back pain    4. Encounter for vaccination    5. Encounter for screening mammogram for breast cancer          Plan:       Acute cystitis without hematuria  -     nitrofurantoin, macrocrystal-monohydrate, (MACROBID) 100 MG capsule; Take 1 capsule (100 mg total) by mouth 2 (two) times a day.  Dispense: 14 capsule; Refill: 1    Insomnia, unspecified type  -     zolpidem (AMBIEN) 10 mg Tab; Take 1 tablet (10 mg total) by mouth as needed (insomnia).  Dispense: 30 tablet; Refill: 1    Chronic midline low back pain  -     tiZANidine (ZANAFLEX) 4 MG tablet; Take 1 tablet (4 mg total) by mouth 2 (two) times daily as needed (muscl spasm).  Dispense: 60 tablet; Refill: 1    Encounter for vaccination  -     Influenza - Quadrivalent *Preferred* (6 months+) (PF)    Encounter for screening mammogram for breast cancer  -     Mammo Digital Screening Bilat w/ Joe; Future; Expected date: 09/15/2022      Medication List with Changes/Refills   New Medications    NITROFURANTOIN, MACROCRYSTAL-MONOHYDRATE, (MACROBID) 100 MG CAPSULE    Take 1 capsule (100 mg total) by mouth 2 (two) times a day.   Current Medications    CETIRIZINE (ZYRTEC) 10 MG TABLET    Take 10 mg by mouth once daily.    ETODOLAC (LODINE) 400 MG TABLET    Take 400 mg by mouth 2 (two) times daily as needed.    HYDROCHLOROTHIAZIDE (HYDRODIURIL) 25 MG TABLET    Take 1 tablet (25 mg total) by mouth once daily. 1 tablet    IBUPROFEN (ADVIL,MOTRIN) 400 MG TABLET    Take 1 tablet (400 mg total) by mouth every 6 (six) hours as needed.    LEVOCETIRIZINE (XYZAL) 5 MG TABLET    Take 1 tablet (5 mg total) by mouth nightly as needed for Allergies.    LORATADINE (CLARITIN) 10 MG TABLET    Take 1 tablet (10 mg total) by mouth once daily.    LOSARTAN (COZAAR) 50 MG TABLET    Take 1 tablet (50 mg total) by mouth once daily.    NAPROXEN SODIUM (ANAPROX) 550 MG TABLET    TAKE 1 TABLET (550 MG TOTAL) BY MOUTH 2 (TWO) TIMES DAILY WITH MEALS.    NICOTINE (NICODERM CQ) 14 MG/24 HR     Place 1 patch onto the skin once daily. Change location of patch daily.    NICOTINE (NICODERM CQ) 21 MG/24 HR    Place 1 patch onto the skin once daily.    NICOTINE POLACRILEX 2 MG LOZG    Take 1 lozenge (2 mg total) by mouth as needed (Use in place of cigarettes/Do not exceed 4 pieces a day).    POTASSIUM CHLORIDE SA (K-DUR,KLOR-CON) 20 MEQ TABLET    Take 1 tablet (20 mEq total) by mouth 2 (two) times daily.    SEMAGLUTIDE (OZEMPIC) 1 MG/DOSE (4 MG/3 ML)    Inject 1 mg into the skin every 7 days.   Changed and/or Refilled Medications    Modified Medication Previous Medication    TIZANIDINE (ZANAFLEX) 4 MG TABLET tiZANidine (ZANAFLEX) 4 MG tablet       Take 1 tablet (4 mg total) by mouth 2 (two) times daily as needed (muscl spasm).    Take 1 tablet (4 mg total) by mouth 2 (two) times daily as needed.    ZOLPIDEM (AMBIEN) 10 MG TAB zolpidem (AMBIEN) 10 mg Tab       Take 1 tablet (10 mg total) by mouth as needed (insomnia).    Take 1 tablet (10 mg total) by mouth as needed.   Discontinued Medications    CIPROFLOXACIN HCL (CIPRO) 250 MG TABLET    Take 1 tablet (250 mg total) by mouth 2 (two) times daily. for 7 days    MONTELUKAST (SINGULAIR) 10 MG TABLET    One tablet by mouth every evening    OLOPATADINE (PATANOL) 0.1 % OPHTHALMIC SOLUTION    Place 1 drop into both eyes 2 (two) times daily.    TETRAHYDROZ--HYPROM-GLY 0.05-1-0.36-0.2 % DROP    1 drop into affected eye as needed    TRIAMCINOLONE ACETONIDE 0.5% (KENALOG) 0.5 % CREA    APPLY TO RASH ON FACE TWICE DAILY,. AFTER USING FOR 4 DAYS. DO NOT RESTART USING FOR 1 WEEK

## 2022-09-15 NOTE — PROGRESS NOTES
Patient identified by name and . Allergies reviewed. Administered Influenza vaccine IM using aseptic technique

## 2022-09-27 ENCOUNTER — PATIENT MESSAGE (OUTPATIENT)
Dept: PRIMARY CARE CLINIC | Facility: CLINIC | Age: 62
End: 2022-09-27
Payer: COMMERCIAL

## 2022-09-29 ENCOUNTER — CLINICAL SUPPORT (OUTPATIENT)
Dept: SMOKING CESSATION | Facility: CLINIC | Age: 62
End: 2022-09-29
Payer: COMMERCIAL

## 2022-09-29 DIAGNOSIS — F17.210 MODERATE CIGARETTE SMOKER (10-19 PER DAY): ICD-10-CM

## 2022-09-29 DIAGNOSIS — F17.200 NICOTINE DEPENDENCE: Primary | ICD-10-CM

## 2022-09-29 PROCEDURE — 99404 PR PREVENT COUNSEL,INDIV,60 MIN: ICD-10-PCS | Mod: 95,,,

## 2022-09-29 PROCEDURE — 99404 PREV MED CNSL INDIV APPRX 60: CPT | Mod: 95,,,

## 2022-09-29 RX ORDER — IBUPROFEN 200 MG
1 TABLET ORAL DAILY
Qty: 28 PATCH | Refills: 0 | Status: SHIPPED | OUTPATIENT
Start: 2022-09-29 | End: 2022-12-08 | Stop reason: SDUPTHER

## 2022-09-29 RX ORDER — DM/P-EPHED/ACETAMINOPH/DOXYLAM 30-7.5/3
2 LIQUID (ML) ORAL
Qty: 108 LOZENGE | Refills: 0 | Status: SHIPPED | OUTPATIENT
Start: 2022-09-29 | End: 2022-12-08 | Stop reason: SDUPTHER

## 2022-09-29 NOTE — PROGRESS NOTES
The patient location is: [facility]  The chief complaint leading to consultation is: Nicotine Dependence    Visit type: audiovisual    Face to Face time with patient:  52 minutes of total time spent on the encounter, which includes face to face time and non-face to face time preparing to see the patient (eg, review of tests), Obtaining and/or reviewing separately obtained history, Documenting clinical information in the electronic or other health record, Independently interpreting results (not separately reported) and communicating results to the patient/family/caregiver, or Care coordination (not separately reported).         Each patient to whom he or she provides medical services by telemedicine is:  (1) informed of the relationship between the physician and patient and the respective role of any other health care provider with respect to management of the patient; and (2) notified that he or she may decline to receive medical services by telemedicine and may withdraw from such care at any time.    Notes:  Individual Follow-Up Form    9/29/2022    Quit Date:     Clinical Status of Patient: Outpatient    Continuing Medication: yes  Patches    Other Medications: 2 mg nicotine lozenges PRN     Target Symptoms: Withdrawal and medication side effects. The following were  rated moderate (3) to severe (4) on TCRS:  Moderate (3): none  Severe (4): none    Comments: Patient states smoking about 6-10 cigs/day. The patient remains on the prescribed tobacco cessation medication regimen of 14 mg patch and 2 mg lozenges without any negative side effects at this time. Refills sent to pharmacy. The patient denies any abnormal behavioral or mental changes at this time. Patient states the need to get back on track towards a quit by her birthday next month. Encouraged patient to use strategies along with the discussion of habitual behavior, high risks situations, understanding urges and cravings, stress and relaxation with open  discussion and additional interventions, Introduced lapses, relapses, understanding them and analyzing the situation of a lapse, conflict issues that may be linked to a lapse. The patient will continue with therapy sessions and medication monitoring by CTTS. Prescribed medication management will be by physician.            Diagnosis: F17.200    Next Visit: 10/20/2022

## 2022-09-29 NOTE — Clinical Note
Patient states smoking about 6-10 cigs/day. The patient remains on the prescribed tobacco cessation medication regimen of 14 mg patch and 2 mg lozenges without any negative side effects at this time. Refills sent to pharmacy. The patient denies any abnormal behavioral or mental changes at this time. Patient states the need to get back on track towards a quit by her birthday next month. Encouraged patient to use strategies along with the discussion of habitual behavior, high risks situations, understanding urges and cravings, stress and relaxation with open discussion and additional interventions, Introduced lapses, relapses, understanding them and analyzing the situation of a lapse, conflict issues that may be linked to a lapse. The patient will continue with therapy sessions and medication monitoring by CTTS. Prescribed medication management will be by physician.

## 2022-10-06 ENCOUNTER — PATIENT MESSAGE (OUTPATIENT)
Dept: BARIATRICS | Facility: CLINIC | Age: 62
End: 2022-10-06
Payer: COMMERCIAL

## 2022-10-13 ENCOUNTER — TELEPHONE (OUTPATIENT)
Dept: SMOKING CESSATION | Facility: CLINIC | Age: 62
End: 2022-10-13
Payer: COMMERCIAL

## 2022-10-13 NOTE — TELEPHONE ENCOUNTER
Called patient to follow-up on missed appointment. Left a voicemail letting patient to know to call CTTS to reschedule.

## 2022-10-17 ENCOUNTER — CLINICAL SUPPORT (OUTPATIENT)
Dept: SMOKING CESSATION | Facility: CLINIC | Age: 62
End: 2022-10-17
Payer: COMMERCIAL

## 2022-10-17 DIAGNOSIS — F17.200 NICOTINE DEPENDENCE: Primary | ICD-10-CM

## 2022-10-17 PROCEDURE — 99999 PR PBB SHADOW E&M-EST. PATIENT-LVL I: ICD-10-PCS | Mod: PBBFAC,,,

## 2022-10-17 PROCEDURE — 99999 PR PBB SHADOW E&M-EST. PATIENT-LVL I: CPT | Mod: PBBFAC,,,

## 2022-10-17 PROCEDURE — 99407 PR TOBACCO USE CESSATION INTENSIVE >10 MINUTES: ICD-10-PCS | Mod: S$GLB,,,

## 2022-10-17 PROCEDURE — 99407 BEHAV CHNG SMOKING > 10 MIN: CPT | Mod: S$GLB,,,

## 2022-10-17 NOTE — PROGRESS NOTES
Spoke with patient in regard to rescheduling her virtual appointment. She states the need to increase her patch dosage to 21 mg for next visit, she is currently using the 14 mg. Informed patient of benefit period, future follow ups, and contact information if any further help or support is needed. Will complete smart form for 3 month follow up on Quit attempt #1. Patient is scheduled for a virtual follow up on 11/3/2022 @ 5pm.

## 2022-11-02 ENCOUNTER — HOSPITAL ENCOUNTER (OUTPATIENT)
Dept: RADIOLOGY | Facility: HOSPITAL | Age: 62
Discharge: HOME | End: 2022-11-02
Attending: INTERNAL MEDICINE
Payer: COMMERCIAL

## 2022-11-02 DIAGNOSIS — Z12.31 ENCOUNTER FOR SCREENING MAMMOGRAM FOR BREAST CANCER: ICD-10-CM

## 2022-11-02 PROCEDURE — 77067 SCR MAMMO BI INCL CAD: CPT | Mod: 26,,, | Performed by: RADIOLOGY

## 2022-11-02 PROCEDURE — 77067 SCR MAMMO BI INCL CAD: CPT | Mod: TC

## 2022-11-02 PROCEDURE — 77063 BREAST TOMOSYNTHESIS BI: CPT | Mod: 26,,, | Performed by: RADIOLOGY

## 2022-11-02 PROCEDURE — 77063 MAMMO DIGITAL SCREENING BILAT WITH TOMO: ICD-10-PCS | Mod: 26,,, | Performed by: RADIOLOGY

## 2022-11-02 PROCEDURE — 77063 BREAST TOMOSYNTHESIS BI: CPT | Mod: TC

## 2022-11-02 PROCEDURE — 77067 MAMMO DIGITAL SCREENING BILAT WITH TOMO: ICD-10-PCS | Mod: 26,,, | Performed by: RADIOLOGY

## 2022-11-16 ENCOUNTER — CLINICAL SUPPORT (OUTPATIENT)
Dept: SMOKING CESSATION | Facility: CLINIC | Age: 62
End: 2022-11-16
Payer: COMMERCIAL

## 2022-11-16 DIAGNOSIS — F17.200 NICOTINE DEPENDENCE: Primary | ICD-10-CM

## 2022-11-16 PROCEDURE — 99999 PR PBB SHADOW E&M-EST. PATIENT-LVL I: CPT | Mod: PBBFAC,,,

## 2022-11-16 PROCEDURE — 99407 PR TOBACCO USE CESSATION INTENSIVE >10 MINUTES: ICD-10-PCS | Mod: S$GLB,,, | Performed by: GENERAL PRACTICE

## 2022-11-16 PROCEDURE — 99999 PR PBB SHADOW E&M-EST. PATIENT-LVL I: ICD-10-PCS | Mod: PBBFAC,,,

## 2022-11-16 PROCEDURE — 99407 BEHAV CHNG SMOKING > 10 MIN: CPT | Mod: S$GLB,,, | Performed by: GENERAL PRACTICE

## 2022-11-16 NOTE — PROGRESS NOTES
Spoke with patient today in regard to smoking cessation progress 6 month telephone follow up, she states that she is not tobacco free.  Commended patient on the accomplishments thus far.  Informed patient of benefit period, future follow up, and contact information if any further help or support is needed.  Will complete smart form for 6 month follow up on Quit attempt #1

## 2022-11-17 ENCOUNTER — CLINICAL SUPPORT (OUTPATIENT)
Dept: SMOKING CESSATION | Facility: CLINIC | Age: 62
End: 2022-11-17
Payer: COMMERCIAL

## 2022-11-17 DIAGNOSIS — F17.200 NICOTINE DEPENDENCE: Primary | ICD-10-CM

## 2022-11-17 PROCEDURE — 99403 PR PREVENT COUNSEL,INDIV,45 MIN: ICD-10-PCS | Mod: 95,,,

## 2022-11-17 PROCEDURE — 99403 PREV MED CNSL INDIV APPRX 45: CPT | Mod: 95,,,

## 2022-11-17 NOTE — PROGRESS NOTES
The patient location is: [facility]   The chief complaint leading to consultation is: Nicotine Dependence    Visit type: audiovisual    Face to Face time with patient: 45 minutes of total time spent on the encounter, which includes face to face time and non-face to face time preparing to see the patient (eg, review of tests), Obtaining and/or reviewing separately obtained history, Documenting clinical information in the electronic or other health record, Independently interpreting results (not separately reported) and communicating results to the patient/family/caregiver, or Care coordination (not separately reported).         Each patient to whom he or she provides medical services by telemedicine is:  (1) informed of the relationship between the physician and patient and the respective role of any other health care provider with respect to management of the patient; and (2) notified that he or she may decline to receive medical services by telemedicine and may withdraw from such care at any time.    Notes:  Individual Follow-Up Form    11/17/2022    Quit Date:     Clinical Status of Patient: Outpatient    Continuing Medication: yes  Patches    Other Medications: 2 mg nicotine lozenges PRN     Target Symptoms: Withdrawal and medication side effects. The following were  rated moderate (3) to severe (4) on TCRS:  Moderate (3): none  Severe (4): none    Comments: Patient states smoking between 6 and 8 cpd. The patient remains on the prescribed tobacco cessation medication regimen of 14 mg patch without any negative side effects at this time. No refill needed at this time. The patient denies any abnormal behavioral or mental changes at this time. Patient states she felt the 14 mg patch wasn't working, so we discussed the usage and dosage. Patient now expressed an understanding while feeling more comfortable with her quit attempt. Encouraged patient to continue use of  strategies and try a rate reduction of about two  cigarettes for next visit. The patient will continue with  therapy sessions and medication monitoring by CTTS. Prescribed medication management will be by physician.      Diagnosis: F17.200    Next Visit: 12/8/2022

## 2022-11-17 NOTE — Clinical Note
Patient states smoking between 6 and 8 cpd. The patient remains on the prescribed tobacco cessation medication regimen of 14 mg patch without any negative side effects at this time. No refill needed at this time. The patient denies any abnormal behavioral or mental changes at this time. Patient states she felt the 14 mg patch wasn't working, so we discussed the usage and dosage. Patient now expressed an understanding while feeling more comfortable with her quit attempt. Encouraged patient to continue use of  strategies and try a rate reduction of about two cigarettes for next visit. The patient will continue with  therapy sessions and medication monitoring by CTTS. Prescribed medication management will be by physician

## 2022-12-08 ENCOUNTER — CLINICAL SUPPORT (OUTPATIENT)
Dept: SMOKING CESSATION | Facility: CLINIC | Age: 62
End: 2022-12-08
Payer: COMMERCIAL

## 2022-12-08 DIAGNOSIS — F17.210 MODERATE CIGARETTE SMOKER (10-19 PER DAY): ICD-10-CM

## 2022-12-08 DIAGNOSIS — F17.200 NICOTINE DEPENDENCE: Primary | ICD-10-CM

## 2022-12-08 PROCEDURE — 99403 PREV MED CNSL INDIV APPRX 45: CPT | Mod: 95,,,

## 2022-12-08 PROCEDURE — 99403 PR PREVENT COUNSEL,INDIV,45 MIN: ICD-10-PCS | Mod: 95,,,

## 2022-12-08 RX ORDER — IBUPROFEN 200 MG
1 TABLET ORAL DAILY
Qty: 28 PATCH | Refills: 0 | Status: SHIPPED | OUTPATIENT
Start: 2022-12-08 | End: 2023-03-16

## 2022-12-08 RX ORDER — DM/P-EPHED/ACETAMINOPH/DOXYLAM 30-7.5/3
2 LIQUID (ML) ORAL
Qty: 108 LOZENGE | Refills: 0 | Status: SHIPPED | OUTPATIENT
Start: 2022-12-08 | End: 2023-03-16

## 2022-12-08 NOTE — PROGRESS NOTES
The patient location is: [facility]  The chief complaint leading to consultation is: Nicotine Dependence    Visit type: audiovisual    Face to Face time with patient: 45 minutes of total time spent on the encounter, which includes face to face time and non-face to face time preparing to see the patient (eg, review of tests), Obtaining and/or reviewing separately obtained history, Documenting clinical information in the electronic or other health record, Independently interpreting results (not separately reported) and communicating results to the patient/family/caregiver, or Care coordination (not separately reported).         Each patient to whom he or she provides medical services by telemedicine is:  (1) informed of the relationship between the physician and patient and the respective role of any other health care provider with respect to management of the patient; and (2) notified that he or she may decline to receive medical services by telemedicine and may withdraw from such care at any time.    Notes:  Individual Follow-Up Form    12/8/2022    Quit Date:     Clinical Status of Patient: Outpatient    Continuing Medication: yes  Patches or Nicotine Lozenges    Other Medications:      Target Symptoms: Withdrawal and medication side effects. The following were  rated moderate (3) to severe (4) on TCRS:  Moderate (3): none  Severe (4): none    Comments: Patient states 1 pack of cigarettes last her about 3 days. The patient remains on the prescribed tobacco cessation medication regimen of 14 mg patch and 2 mg lozenges PRN(in place of a cigarette)without any negative side effects at this time. Refills sent to pharmacy. The patient denies any abnormal behavioral or mental changes at this time. Encouraged patient to use NRTs daily and the strategies previously discussed. Patient states she has some upcoming work and events that may be stressful before the holidays and she will continue to stay focused on her quit attempt.  Encouraged patient to try a rate reduction of about 2-3 cigs for the next visit. The patient will continue with  therapy sessions and medication monitoring by CTTS. Prescribed medication management will be by physician.          Diagnosis: F17.200    Next Visit: 1/5/2023

## 2022-12-08 NOTE — Clinical Note
Patient states 1 pack of cigarettes last her about 3 days. The patient remains on the prescribed tobacco cessation medication regimen of 14 mg patch and 2 mg lozenges PRN(in place of a cigarette)without any negative side effects at this time. Refills sent to pharmacy. The patient denies any abnormal behavioral or mental changes at this time. Encouraged patient to use NRTs daily and the strategies previously discussed. Patient states she has some upcoming work and events that may be stressful before the holidays and she will continue to stay focused on her quit attempt. Encouraged patient to try a rate reduction of about 2-3 cigs for the next visit. The patient will continue with  therapy sessions and medication monitoring by CTTS. Prescribed medication management will be by physician.

## 2023-03-03 ENCOUNTER — PATIENT OUTREACH (OUTPATIENT)
Dept: ADMINISTRATIVE | Facility: HOSPITAL | Age: 63
End: 2023-03-03
Payer: COMMERCIAL

## 2023-03-03 NOTE — PROGRESS NOTES
Health Maintenance Due   Topic Date Due    COVID-19 Vaccine (4 - Booster for Pfizer series) 12/04/2021    Colorectal Cancer Screening  01/22/2023        Chart review done.   HM updated.   Immunizations reviewed & updated.   Care Everywhere updated.

## 2023-03-10 ENCOUNTER — PATIENT MESSAGE (OUTPATIENT)
Dept: PODIATRY | Facility: CLINIC | Age: 63
End: 2023-03-10

## 2023-03-10 ENCOUNTER — OFFICE VISIT (OUTPATIENT)
Dept: PODIATRY | Facility: CLINIC | Age: 63
End: 2023-03-10
Payer: COMMERCIAL

## 2023-03-10 VITALS
DIASTOLIC BLOOD PRESSURE: 96 MMHG | SYSTOLIC BLOOD PRESSURE: 156 MMHG | HEART RATE: 102 BPM | HEIGHT: 63 IN | BODY MASS INDEX: 38.39 KG/M2 | WEIGHT: 216.69 LBS

## 2023-03-10 DIAGNOSIS — L60.9 DISEASE OF NAIL: Primary | ICD-10-CM

## 2023-03-10 PROCEDURE — 3077F SYST BP >= 140 MM HG: CPT | Mod: CPTII,S$GLB,, | Performed by: PODIATRIST

## 2023-03-10 PROCEDURE — 3080F DIAST BP >= 90 MM HG: CPT | Mod: CPTII,S$GLB,, | Performed by: PODIATRIST

## 2023-03-10 PROCEDURE — 3008F PR BODY MASS INDEX (BMI) DOCUMENTED: ICD-10-PCS | Mod: CPTII,S$GLB,, | Performed by: PODIATRIST

## 2023-03-10 PROCEDURE — 99999 PR PBB SHADOW E&M-EST. PATIENT-LVL III: CPT | Mod: PBBFAC,,, | Performed by: PODIATRIST

## 2023-03-10 PROCEDURE — 3080F PR MOST RECENT DIASTOLIC BLOOD PRESSURE >= 90 MM HG: ICD-10-PCS | Mod: CPTII,S$GLB,, | Performed by: PODIATRIST

## 2023-03-10 PROCEDURE — 99203 OFFICE O/P NEW LOW 30 MIN: CPT | Mod: S$GLB,,, | Performed by: PODIATRIST

## 2023-03-10 PROCEDURE — 3008F BODY MASS INDEX DOCD: CPT | Mod: CPTII,S$GLB,, | Performed by: PODIATRIST

## 2023-03-10 PROCEDURE — 99203 PR OFFICE/OUTPT VISIT, NEW, LEVL III, 30-44 MIN: ICD-10-PCS | Mod: S$GLB,,, | Performed by: PODIATRIST

## 2023-03-10 PROCEDURE — 99999 PR PBB SHADOW E&M-EST. PATIENT-LVL III: ICD-10-PCS | Mod: PBBFAC,,, | Performed by: PODIATRIST

## 2023-03-10 PROCEDURE — 3077F PR MOST RECENT SYSTOLIC BLOOD PRESSURE >= 140 MM HG: ICD-10-PCS | Mod: CPTII,S$GLB,, | Performed by: PODIATRIST

## 2023-03-16 ENCOUNTER — OFFICE VISIT (OUTPATIENT)
Dept: PRIMARY CARE CLINIC | Facility: CLINIC | Age: 63
End: 2023-03-16
Payer: COMMERCIAL

## 2023-03-16 VITALS
SYSTOLIC BLOOD PRESSURE: 124 MMHG | HEART RATE: 86 BPM | WEIGHT: 212 LBS | BODY MASS INDEX: 37.56 KG/M2 | DIASTOLIC BLOOD PRESSURE: 80 MMHG | TEMPERATURE: 98 F | OXYGEN SATURATION: 98 % | HEIGHT: 63 IN | RESPIRATION RATE: 18 BRPM

## 2023-03-16 DIAGNOSIS — Z13.6 ENCOUNTER FOR LIPID SCREENING FOR CARDIOVASCULAR DISEASE: ICD-10-CM

## 2023-03-16 DIAGNOSIS — M25.561 CHRONIC PAIN OF BOTH KNEES: ICD-10-CM

## 2023-03-16 DIAGNOSIS — M17.12 ARTHRITIS OF LEFT KNEE: Primary | ICD-10-CM

## 2023-03-16 DIAGNOSIS — M25.562 CHRONIC PAIN OF BOTH KNEES: ICD-10-CM

## 2023-03-16 DIAGNOSIS — R73.03 PREDIABETES: ICD-10-CM

## 2023-03-16 DIAGNOSIS — Z13.220 ENCOUNTER FOR LIPID SCREENING FOR CARDIOVASCULAR DISEASE: ICD-10-CM

## 2023-03-16 DIAGNOSIS — Z12.11 ENCOUNTER FOR SCREENING FOR MALIGNANT NEOPLASM OF COLON: ICD-10-CM

## 2023-03-16 DIAGNOSIS — I10 ESSENTIAL HYPERTENSION: ICD-10-CM

## 2023-03-16 DIAGNOSIS — E66.01 CLASS 3 SEVERE OBESITY WITH BODY MASS INDEX (BMI) OF 40.0 TO 44.9 IN ADULT, UNSPECIFIED OBESITY TYPE, UNSPECIFIED WHETHER SERIOUS COMORBIDITY PRESENT: ICD-10-CM

## 2023-03-16 DIAGNOSIS — G89.29 CHRONIC PAIN OF BOTH KNEES: ICD-10-CM

## 2023-03-16 PROCEDURE — 99999 PR PBB SHADOW E&M-EST. PATIENT-LVL IV: ICD-10-PCS | Mod: PBBFAC,,, | Performed by: INTERNAL MEDICINE

## 2023-03-16 PROCEDURE — 99999 PR PBB SHADOW E&M-EST. PATIENT-LVL IV: CPT | Mod: PBBFAC,,, | Performed by: INTERNAL MEDICINE

## 2023-03-16 PROCEDURE — 99214 PR OFFICE/OUTPT VISIT, EST, LEVL IV, 30-39 MIN: ICD-10-PCS | Mod: S$GLB,,, | Performed by: INTERNAL MEDICINE

## 2023-03-16 PROCEDURE — 99214 OFFICE O/P EST MOD 30 MIN: CPT | Mod: S$GLB,,, | Performed by: INTERNAL MEDICINE

## 2023-03-16 RX ORDER — IBUPROFEN 400 MG/1
400 TABLET ORAL EVERY 6 HOURS PRN
Qty: 20 TABLET | Refills: 0 | Status: CANCELLED | OUTPATIENT
Start: 2023-03-16

## 2023-03-16 RX ORDER — HYDROCHLOROTHIAZIDE 25 MG/1
25 TABLET ORAL DAILY
Qty: 90 TABLET | Refills: 1 | Status: SHIPPED | OUTPATIENT
Start: 2023-03-16 | End: 2023-06-16 | Stop reason: SDUPTHER

## 2023-03-16 RX ORDER — ETODOLAC 400 MG/1
400 TABLET, FILM COATED ORAL 2 TIMES DAILY PRN
Qty: 30 TABLET | Refills: 2 | Status: SHIPPED | OUTPATIENT
Start: 2023-03-16 | End: 2023-06-16 | Stop reason: SDUPTHER

## 2023-03-16 NOTE — LETTER
March 16, 2023      [facility] [address]  [address], [facility] [address]  [place] [address]  Phone: [phone]  Fax: [phone]       Patient: Luúl Rosenthal   YOB: 1960  Date of Visit: 03/16/2023    To Whom It May Concern:    Ruddy Rosenthal  was at [facility] on 03/16/2023. The patient may return to work on 3/20/2023 with no restrictions. If you have any questions or concerns, or if I can be of further assistance, please do not hesitate to contact me.    Sincerely,    Ran Asencio MD

## 2023-03-19 NOTE — PROGRESS NOTES
Subjective:       Patient ID: Lulú Rosenthal is a 62 y.o. female.    Chief Complaint: Follow-up (6 month )    HPI  patient visit today for six-month follow-up she has been having Coreg knee pain bilaterally since 2021 when see fell and hurt her left knee visit her sister the knee pain sometime worse swollen painful that she had to use crutches and had to be lying down on the right side to avoid left knee pain and every day she had to go up 21 steps at work she denies short of breath chest pain dyspnea with exertion she had pitting edema +1 bilaterally in the lower extremity.  She deny weight gain she lost some weigh using ozempicweight  no change in bowel better urination  Review of Systems   Constitutional:  Positive for unexpected weight change (wi loss with diet and ozempic).   Respiratory:  Negative for shortness of breath.    Cardiovascular:  Negative for chest pain.   Gastrointestinal:  Negative for abdominal pain.   Musculoskeletal:  Positive for arthralgias (Arthritis of left knee chronic).   Psychiatric/Behavioral:  Negative for dysphoric mood.      Objective:      Physical Exam  Vitals and nursing note reviewed.   Constitutional:       General: She is not in acute distress.     Appearance: She is well-developed.   HENT:      Head: Normocephalic and atraumatic.      Right Ear: External ear normal.      Left Ear: External ear normal.      Nose: Nose normal.      Mouth/Throat:      Pharynx: No oropharyngeal exudate.   Eyes:      Conjunctiva/sclera: Conjunctivae normal.      Pupils: Pupils are equal, round, and reactive to light.   Neck:      Thyroid: No thyromegaly.   Cardiovascular:      Rate and Rhythm: Normal rate and regular rhythm.      Heart sounds: Normal heart sounds. No murmur heard.    No friction rub. No gallop.   Pulmonary:      Effort: Pulmonary effort is normal. No respiratory distress.      Breath sounds: Normal breath sounds. No wheezing.   Abdominal:      General: Bowel sounds are normal.  There is no distension.      Palpations: Abdomen is soft.      Tenderness: There is no abdominal tenderness.   Musculoskeletal:         General: Tenderness (Tenderness around left kneecap with slight swelling no erythema no heat) present. No deformity. Normal range of motion.      Cervical back: Normal range of motion and neck supple.   Lymphadenopathy:      Cervical: No cervical adenopathy.   Skin:     General: Skin is warm and dry.      Capillary Refill: Capillary refill takes less than 2 seconds.      Findings: No erythema or rash.   Neurological:      Mental Status: She is alert and oriented to person, place, and time.   Psychiatric:         Thought Content: Thought content normal.         Judgment: Judgment normal.       Assessment:       1. Arthritis of left knee    2. Essential hypertension    3. Encounter for screening for malignant neoplasm of colon    4. Prediabetes    5. Class 3 severe obesity with body mass index (BMI) of 40.0 to 44.9 in adult, unspecified obesity type, unspecified whether serious comorbidity present    6. Encounter for lipid screening for cardiovascular disease    7. Chronic pain of both knees          Plan:       Arthritis of left knee  -     Rheumatoid Factor; Future; Expected date: 03/16/2023  -     Uric Acid; Future; Expected date: 03/16/2023  -     X-Ray Knee 3 View Left; Future; Expected date: 03/16/2023  -     etodolac (LODINE) 400 MG tablet; Take 1 tablet (400 mg total) by mouth 2 (two) times daily as needed (arthritis). Arthritis knee  Dispense: 30 tablet; Refill: 2  -     X-Ray Knee 1 or 2 View Bilateral; Future; Expected date: 03/16/2023    Essential hypertension  -     hydroCHLOROthiazide (HYDRODIURIL) 25 MG tablet; Take 1 tablet (25 mg total) by mouth once daily. 1 tablet  Dispense: 90 tablet; Refill: 1  -     CBC Auto Differential; Future; Expected date: 03/16/2023  -     Comprehensive Metabolic Panel; Future; Expected date: 03/16/2023    Encounter for screening for  malignant neoplasm of colon  -     Case Request Endoscopy: COLONOSCOPY    Prediabetes  Comments:  HGba1c 5. pt is doing well on ozempic for prediabect and overweigh    Class 3 severe obesity with body mass index (BMI) of 40.0 to 44.9 in adult, unspecified obesity type, unspecified whether serious comorbidity present  Comments:  pt is loosing wt with diet ozempic  Orders:  -     TSH; Future; Expected date: 03/16/2023    Encounter for lipid screening for cardiovascular disease  -     Lipid Panel; Future; Expected date: 03/16/2023    Chronic pain of both knees  -     X-Ray Knee 1 or 2 View Bilateral; Future; Expected date: 03/16/2023        Medication List with Changes/Refills   Current Medications    CETIRIZINE (ZYRTEC) 10 MG TABLET    Take 10 mg by mouth once daily.    LEVOCETIRIZINE (XYZAL) 5 MG TABLET    Take 1 tablet (5 mg total) by mouth nightly as needed for Allergies.    LOSARTAN (COZAAR) 50 MG TABLET    Take 1 tablet (50 mg total) by mouth once daily.    NITROFURANTOIN, MACROCRYSTAL-MONOHYDRATE, (MACROBID) 100 MG CAPSULE    Take 1 capsule (100 mg total) by mouth 2 (two) times a day.    POTASSIUM CHLORIDE SA (K-DUR,KLOR-CON) 20 MEQ TABLET    Take 1 tablet (20 mEq total) by mouth 2 (two) times daily.    SEMAGLUTIDE (OZEMPIC) 1 MG/DOSE (4 MG/3 ML)    INJECT 1 MG EVERY 7 DAYS    TIZANIDINE (ZANAFLEX) 4 MG TABLET    Take 1 tablet (4 mg total) by mouth 2 (two) times daily as needed (muscl spasm).    ZOLPIDEM (AMBIEN) 10 MG TAB    Take 1 tablet (10 mg total) by mouth as needed (insomnia).   Changed and/or Refilled Medications    Modified Medication Previous Medication    ETODOLAC (LODINE) 400 MG TABLET etodolac (LODINE) 400 MG tablet       Take 1 tablet (400 mg total) by mouth 2 (two) times daily as needed (arthritis). Arthritis knee    Take 400 mg by mouth 2 (two) times daily as needed.    HYDROCHLOROTHIAZIDE (HYDRODIURIL) 25 MG TABLET hydroCHLOROthiazide (HYDRODIURIL) 25 MG tablet       Take 1 tablet (25 mg total)  by mouth once daily. 1 tablet    Take 1 tablet (25 mg total) by mouth once daily. 1 tablet   Discontinued Medications    IBUPROFEN (ADVIL,MOTRIN) 400 MG TABLET    Take 1 tablet (400 mg total) by mouth every 6 (six) hours as needed.    LORATADINE (CLARITIN) 10 MG TABLET    Take 1 tablet (10 mg total) by mouth once daily.    NAPROXEN SODIUM (ANAPROX) 550 MG TABLET    TAKE 1 TABLET (550 MG TOTAL) BY MOUTH 2 (TWO) TIMES DAILY WITH MEALS.    NICOTINE (NICODERM CQ) 14 MG/24 HR    Place 1 patch onto the skin once daily. Change location of patch daily.    NICOTINE (NICODERM CQ) 21 MG/24 HR    Place 1 patch onto the skin once daily.    NICOTINE POLACRILEX 2 MG LOZG    Take 1 lozenge (2 mg total) by mouth as needed (Use in place of cigarettes/Do not exceed 4 pieces a day).

## 2023-03-20 ENCOUNTER — TELEPHONE (OUTPATIENT)
Dept: SURGERY | Facility: CLINIC | Age: 63
End: 2023-03-20
Payer: COMMERCIAL

## 2023-03-20 RX ORDER — SODIUM, POTASSIUM,MAG SULFATES 17.5-3.13G
1 SOLUTION, RECONSTITUTED, ORAL ORAL DAILY
Qty: 1 KIT | Refills: 0 | Status: SHIPPED | OUTPATIENT
Start: 2023-03-20 | End: 2023-03-22

## 2023-03-20 NOTE — TELEPHONE ENCOUNTER
Called patient in reference to a referral to Colorectal Surgery for colon cancer screening. Patient verbally consented to a Colonoscopy and requested to be scheduled for a Colonoscopy on 04/26/2023 Patient was advised a designated  is required on the day of the Colonoscopy to drive the patient home and the  must be at least. 18 years old.Colonoscopy Prep instructions were thoroughly explained and discussed with the patient.It was emphasized, and reiterated to the patient, to please not to follow the bowel prep instructions that comes with the bowel prep package.However, to please follow the prep instructions that will be received in the mail,or via the [employer] portal, or by both modes of delivery, which ever method of delivery the patient prefers,from the [facility]   Patient acknowledges understanding Prep instructions as explained and discussed on the phone.. Patient was advised the Colonoscopy Prep instructions discussed and explained on the phone,are being mailed out to the patient's verified address on file,or put onto the [employer] portal,or both methods of delivery, whichever the patient prefers. Patient's address on file was verified with the patient for accuracy of mailing. Patient's medications on file was reviewed with the patient for accuracy of information. Patient denies taking  any other medications other than those listed and verified on medication profile.Patient was explained the Colonoscopy will be performed here at [facility]. Patient was further explained the Pre-Op will call one day prior to the procedure date, to discuss Pre-Op instructions;and what time to report for the Colonoscopy. The patient was given the opportunity to ask any questions about the Colonoscopy. No further issues were discussed.

## 2023-03-20 NOTE — TELEPHONE ENCOUNTER
The patient has been advised the Colonoscopy Prep Kit will be ordered from the patient's verified preferred pharmacy on file. The medication can  be picked up by the patient, or the patient's designated representative.The patient was further explained the Colonoscopy Prep instructions will be mailed to the patient verified mailing address on file, or put onto the [employer] portal, whichever method of delivery the patient prefers.Additionally this patient was informed,not to follow the instructions that comes with the bowel prep medication. However, the patient was instructed to please follow the  Colonoscopy Bowel Prep instructions that are being provided by the . The patient was asked to please to follow the Colonoscopy Prep instructions being provided as precisely,and  meticulously as possible.The patient was advised you  will receive a follow up phone call to summarize the Colonoscopy Prep instructions prior to the scheduled Colonoscopy procedure date. At this time the patient will be given an opportunity to ask any questions regarding the Colonoscopy procedure, and it's associated Bowel Prep instructions.

## 2023-04-10 NOTE — PROGRESS NOTES
Subjective:      Patient ID: Lulú Rosenthal is a 62 y.o. female.    Chief Complaint: Nail Care (Right foot big toenail pain )    Lulú is a 62 y.o. female who presents to the clinic complaining of thick and discolored toenails on the right foot. Lulú is inquiring about treatment options. Pt states that the toenail gets thick and is really painful.     Review of Systems   Constitutional: Negative for chills, fever and malaise/fatigue.   HENT:  Negative for hearing loss.    Cardiovascular:  Negative for claudication.   Respiratory:  Negative for shortness of breath.    Skin:  Positive for nail changes. Negative for flushing and rash.   Musculoskeletal:  Negative for joint pain and myalgias.   Neurological:  Negative for loss of balance, numbness, paresthesias and sensory change.   Psychiatric/Behavioral:  Negative for altered mental status.          Objective:      Physical Exam  Vitals reviewed.   Cardiovascular:      Pulses:           Dorsalis pedis pulses are 2+ on the right side and 2+ on the left side.        Posterior tibial pulses are 2+ on the right side and 2+ on the left side.      Comments: No edema noted b/L  Musculoskeletal:      Comments:        Feet:      Right foot:      Protective Sensation: 5 sites tested.  5 sites sensed.      Left foot:      Protective Sensation: 5 sites tested.  5 sites sensed.   Skin:     General: Skin is warm.      Capillary Refill: Capillary refill takes 2 to 3 seconds.      Comments: Normal skin tugor noted.   No open lesion noted b/L  Skin temp is warm to warm from proximal to distal b/L.  Webspaces clean, dry, and intact  Right hallux nail thickened and incurvated   Neurological:      Mental Status: She is alert.      Comments: Gross sensation intact b/L             Assessment:       Encounter Diagnosis   Name Primary?    Disease of nail - Right Foot Yes         Plan:       Lulú was seen today for nail care.    Diagnoses and all orders for this visit:    Disease of nail  - Right Foot      I counseled the patient on her conditions, their implications and medical management.  Nails debrided as a courtesy  Pt was seen today for changes in toenail. Pt was advised that nail changes could be due to toenail fungus or damage/trauma to toenail or toenail matrix.   Since hallux nail is thickened, painful, and incurvated pt advised a permanent nail avulsion can be done to permanently stop the nail from growing back  OTC kerasal recommended to pt   Call or return to clinic prn if these symptoms worsen or fail to improve as anticipated.    .

## 2023-04-19 ENCOUNTER — TELEPHONE (OUTPATIENT)
Dept: GASTROENTEROLOGY | Facility: CLINIC | Age: 63
End: 2023-04-19
Payer: COMMERCIAL

## 2023-04-19 NOTE — TELEPHONE ENCOUNTER
----- Message from Katrina Jovel sent at 4/19/2023 11:11 AM CDT -----  Type:  Patient Returning Call    Who Called:pt  Who Left Message for Patient:office  Does the patient know what this is regarding?:pt is requesting to get a return call to discuss prep instructions   Would the patient rather a call back or a response via [facility]? call  Best Call Back Number:[phone]  Additional Information:

## 2023-04-19 NOTE — TELEPHONE ENCOUNTER
----- Message from Anastasiia Kumar sent at 4/19/2023 10:14 AM CDT -----  Type:  questions about prep    Who Called: pt   Would the patient rather a call back or a response via [facility]? call  Best Call Back Number: [phone]  Additional Information:   Pt requesting a call

## 2023-04-19 NOTE — TELEPHONE ENCOUNTER
Spoke with pt went over colonoscopy instructions. All questions answered. Verbalized understanding

## 2023-04-21 ENCOUNTER — TELEPHONE (OUTPATIENT)
Dept: GASTROENTEROLOGY | Facility: CLINIC | Age: 63
End: 2023-04-21
Payer: COMMERCIAL

## 2023-04-21 NOTE — TELEPHONE ENCOUNTER
Suzanne ROSS Staff  Good evening     Patient Auth is still pending via [employer] I will notify when auth has been approved. I will let patient know to f/u if auth is not in by 4/26.     Thanks     Suzanne MG

## 2023-05-01 DIAGNOSIS — R73.03 PREDIABETES: ICD-10-CM

## 2023-05-01 DIAGNOSIS — E66.01 CLASS 3 SEVERE OBESITY WITH BODY MASS INDEX (BMI) OF 40.0 TO 44.9 IN ADULT, UNSPECIFIED OBESITY TYPE, UNSPECIFIED WHETHER SERIOUS COMORBIDITY PRESENT: ICD-10-CM

## 2023-05-01 RX ORDER — SEMAGLUTIDE 1.34 MG/ML
INJECTION, SOLUTION SUBCUTANEOUS
Qty: 9 ML | Refills: 1 | Status: SHIPPED | OUTPATIENT
Start: 2023-05-01 | End: 2023-06-16

## 2023-05-01 NOTE — TELEPHONE ENCOUNTER
Refill Routing Note   Medication(s) are not appropriate for processing by [facility] for the following reason(s):      Required labs outdated: a1c    [facility] action(s):  Defer Labs due        Medication reconciliation completed: No     Appointments  past 12m or future 3m with PCP    Date Provider   Last Visit   3/16/2023 Ran Asencio MD   Next Visit   6/16/2023 Ran Asencio MD   ED visits in past 90 days: 0        Note composed:1:30 PM 05/01/2023

## 2023-05-01 NOTE — TELEPHONE ENCOUNTER
Care Due:                  Date            Visit Type   Department     Provider  --------------------------------------------------------------------------------                                EP -                              PRIMARY      [facility]  Last Visit: 03-      CARE ([facility])   PRIMARY CARE   Ran Asencio                               -                              PRIMARY      [facility]  Next Visit: 06-      CARE ([facility])   PRIMARY CARE   Ran Asencio                                                            Last  Test          Frequency    Reason                     Performed    Due Date  --------------------------------------------------------------------------------    HBA1C.......  6 months...  semaglutide..............  09- 03-    Health [facility] Embedded Care Due Messages. Reference number: [id number].   5/01/2023 1:06:27 AM CDT

## 2023-05-17 ENCOUNTER — TELEPHONE (OUTPATIENT)
Dept: SMOKING CESSATION | Facility: CLINIC | Age: 63
End: 2023-05-17
Payer: COMMERCIAL

## 2023-05-17 ENCOUNTER — CLINICAL SUPPORT (OUTPATIENT)
Dept: SMOKING CESSATION | Facility: CLINIC | Age: 63
End: 2023-05-17
Payer: COMMERCIAL

## 2023-05-17 DIAGNOSIS — F17.200 NICOTINE DEPENDENCE: Primary | ICD-10-CM

## 2023-05-17 PROCEDURE — 99407 PR TOBACCO USE CESSATION INTENSIVE >10 MINUTES: ICD-10-PCS | Mod: S$GLB,,, | Performed by: GENERAL PRACTICE

## 2023-05-17 PROCEDURE — 99999 PR PBB SHADOW E&M-EST. PATIENT-LVL I: ICD-10-PCS | Mod: PBBFAC,,,

## 2023-05-17 PROCEDURE — 99999 PR PBB SHADOW E&M-EST. PATIENT-LVL I: CPT | Mod: PBBFAC,,,

## 2023-05-17 PROCEDURE — 99407 BEHAV CHNG SMOKING > 10 MIN: CPT | Mod: S$GLB,,, | Performed by: GENERAL PRACTICE

## 2023-05-17 NOTE — TELEPHONE ENCOUNTER
1st attempt left message regarding smoking cessation quit 1 episode. Voice message includes name and contact information.

## 2023-05-17 NOTE — PROGRESS NOTES
Spoke with patient today in regard to smoking cessation progress 12 month telephone follow up, she states that she is not tobacco free.  Patient stated that she trimmed down to 2 packs per week.  Commended patient on the accomplishments thus far.  Informed patient of benefit period, future follow up, and contact information if any further help or support is needed.  Will complete smart form for 12 month follow up on Quit attempt #1 and resolved episode.

## 2023-06-13 ENCOUNTER — OFFICE VISIT (OUTPATIENT)
Dept: OBSTETRICS AND GYNECOLOGY | Facility: CLINIC | Age: 63
End: 2023-06-13
Payer: COMMERCIAL

## 2023-06-13 ENCOUNTER — TELEPHONE (OUTPATIENT)
Dept: OBSTETRICS AND GYNECOLOGY | Facility: CLINIC | Age: 63
End: 2023-06-13
Payer: COMMERCIAL

## 2023-06-13 VITALS
SYSTOLIC BLOOD PRESSURE: 128 MMHG | HEIGHT: 63 IN | DIASTOLIC BLOOD PRESSURE: 64 MMHG | BODY MASS INDEX: 36.58 KG/M2 | WEIGHT: 206.44 LBS

## 2023-06-13 DIAGNOSIS — N81.10 BADEN-WALKER GRADE 1 CYSTOCELE: ICD-10-CM

## 2023-06-13 DIAGNOSIS — Z12.4 CERVICAL CANCER SCREENING: ICD-10-CM

## 2023-06-13 DIAGNOSIS — N81.4 UTERINE PROLAPSE: ICD-10-CM

## 2023-06-13 DIAGNOSIS — Z01.419 WELL WOMAN EXAM WITH ROUTINE GYNECOLOGICAL EXAM: Primary | ICD-10-CM

## 2023-06-13 PROCEDURE — 99999 PR PBB SHADOW E&M-EST. PATIENT-LVL III: ICD-10-PCS | Mod: PBBFAC,,, | Performed by: OBSTETRICS & GYNECOLOGY

## 2023-06-13 PROCEDURE — 1160F RVW MEDS BY RX/DR IN RCRD: CPT | Mod: CPTII,S$GLB,, | Performed by: OBSTETRICS & GYNECOLOGY

## 2023-06-13 PROCEDURE — 99396 PREV VISIT EST AGE 40-64: CPT | Mod: S$GLB,,, | Performed by: OBSTETRICS & GYNECOLOGY

## 2023-06-13 PROCEDURE — 3074F SYST BP LT 130 MM HG: CPT | Mod: CPTII,S$GLB,, | Performed by: OBSTETRICS & GYNECOLOGY

## 2023-06-13 PROCEDURE — 3078F DIAST BP <80 MM HG: CPT | Mod: CPTII,S$GLB,, | Performed by: OBSTETRICS & GYNECOLOGY

## 2023-06-13 PROCEDURE — 1159F PR MEDICATION LIST DOCUMENTED IN MEDICAL RECORD: ICD-10-PCS | Mod: CPTII,S$GLB,, | Performed by: OBSTETRICS & GYNECOLOGY

## 2023-06-13 PROCEDURE — 1160F PR REVIEW ALL MEDS BY PRESCRIBER/CLIN PHARMACIST DOCUMENTED: ICD-10-PCS | Mod: CPTII,S$GLB,, | Performed by: OBSTETRICS & GYNECOLOGY

## 2023-06-13 PROCEDURE — 88175 CYTOPATH C/V AUTO FLUID REDO: CPT | Performed by: OBSTETRICS & GYNECOLOGY

## 2023-06-13 PROCEDURE — 3008F PR BODY MASS INDEX (BMI) DOCUMENTED: ICD-10-PCS | Mod: CPTII,S$GLB,, | Performed by: OBSTETRICS & GYNECOLOGY

## 2023-06-13 PROCEDURE — 1159F MED LIST DOCD IN RCRD: CPT | Mod: CPTII,S$GLB,, | Performed by: OBSTETRICS & GYNECOLOGY

## 2023-06-13 PROCEDURE — 3074F PR MOST RECENT SYSTOLIC BLOOD PRESSURE < 130 MM HG: ICD-10-PCS | Mod: CPTII,S$GLB,, | Performed by: OBSTETRICS & GYNECOLOGY

## 2023-06-13 PROCEDURE — 3078F PR MOST RECENT DIASTOLIC BLOOD PRESSURE < 80 MM HG: ICD-10-PCS | Mod: CPTII,S$GLB,, | Performed by: OBSTETRICS & GYNECOLOGY

## 2023-06-13 PROCEDURE — 99999 PR PBB SHADOW E&M-EST. PATIENT-LVL III: CPT | Mod: PBBFAC,,, | Performed by: OBSTETRICS & GYNECOLOGY

## 2023-06-13 PROCEDURE — 3008F BODY MASS INDEX DOCD: CPT | Mod: CPTII,S$GLB,, | Performed by: OBSTETRICS & GYNECOLOGY

## 2023-06-13 PROCEDURE — 99396 PR PREVENTIVE VISIT,EST,40-64: ICD-10-PCS | Mod: S$GLB,,, | Performed by: OBSTETRICS & GYNECOLOGY

## 2023-06-13 PROCEDURE — 87624 HPV HI-RISK TYP POOLED RSLT: CPT | Performed by: OBSTETRICS & GYNECOLOGY

## 2023-06-13 RX ORDER — METHOCARBAMOL 500 MG/1
500 TABLET, FILM COATED ORAL 2 TIMES DAILY PRN
COMMUNITY
Start: 2023-02-23 | End: 2023-06-16 | Stop reason: SDUPTHER

## 2023-06-13 NOTE — LETTER
June 13, 2023      [facility] [place] - OB GYN  [address]  [place] [address]  Phone: [phone]       Patient: Lulú Rosenthal   YOB: 1960  Date of Visit: 06/13/2023    To Whom It May Concern:    Ruddy Rosenthal  was at [facility] on 06/13/2023. The patient may return to work/school on 6/13/2023 with no restrictions. If you have any questions or concerns, or if I can be of further assistance, please do not hesitate to contact me.    Sincerely,    Vida Damon MA

## 2023-06-13 NOTE — PROGRESS NOTES
"[facility]  Ambulatory Clinic  Obstetrics & Gynecology    Visit Date:  2023    Chief Complaint:  Annual GYN exam    History of Present Illness:      Lulú Rosenthal is a 62 y.o.  here for a gynecologic exam.    Pt has no major complaints today.    Pt reports an uneventful transition into menopause and is not on hormone replacement therapy.    Last pap ~ was benign.  Last mammo ~2022 was benign.  Pt had colonscopy 2023.  Pt is a non-smoker.  Pt denies vaginal bleeding, vaginal discharge, dyspareunia, pelvic pain, bloating, early satiety, unintentional weight loss, breast mass/skin changes, incontinence, GI or urinary complaints.    Otherwise, the pt is in her usual state of health.    Past History:  Gynecologic history as noted above.    Review of Systems:      GENERAL:  No fever, fatigue, excessive weight gain or loss  HEENT:  No headaches, hearing changes, visual disturbance  RESPIRATORY:  No cough, shortness of breath  CARDIOVASCULAR:  No chest pain, heart palpitations, leg swelling  BREAST:  No lump, pain, nipple discharge, skin changes  GASTROINTESTINAL:  No nausea, vomiting, constipation, diarrhea, abd pain, rectal bleeding   GENITOURINARY:  See HPI  ENDOCRINE:  No heat or cold intolerance  HEMATOLOGIC:  No easy bruisability or bleeding   LYMPHATICS:  No enlarged nodes  MUSCULOSKELETAL:  No acute joint pain or swelling  SKIN:  No rash, lesions, jaundice  NEUROLOGIC:  No dizziness, weakness, syncope  PSYCHIATRIC:  No significant mood changes, homicidal/suicidal ideations, abuse    Physical Exam:     /64   Ht 5' 3" (1.6 m)   Wt 93.6 kg (206 lb 7.4 oz)   LMP 2016   BMI 36.57 kg/m²   Pulse 50's, Resp rate 14     GENERAL:  No acute distress, well-nourished  HEENT:  Atraumatic, anicteric, moist mucus membranes. Neck supple w/o masses.  BREAST:  Symmetric, nontender, no obvious masses, adenopathy, skin changes or nipple discharge.  LUNGS:  Clear normal " respiratory effort  HEART:  Regular rate and rhythm  ABDOMEN:  Soft, non-tender, non-distended, normoactive bowel sounds, no obvious organomegaly  EXT:  Symmetric w/o cramping, claudication, or edema. +2 distal pulses.  SKIN:  No rashes or bruising  PSYCH:  Mood and affect appropriate  NEURO:  Grossly intact bilaterally    GENITOURINARY:    VULVAR:  Female external genitalia w/o obvious lesions. Normal urethral meatus. No gross lymphadenopathy.   VAGINA:  Mild, age appropriate vulvovaginal atrophy. Stage 1/2 cystocele with mild uterine prolapse. No obvious lesion. No discharge.  CERVIX:  No cervical motion tenderness, discharge, or obvious lesions. Leading edge of cervix ~4.5 cm from introitus.  UTERUS:  Small, non-tender, normal contour  ADNEXA:  No masses, non-tender    RECTAL:  Deferred. No obvious external lesions    Chaperone present for exam.    Assessment:     62 y.o. :    Well woman gynecologic exam  Uterine prolapse  Mild cystocele    Plan:    A gynecologic health assessment was performed with age appropriate counseling.    Cervical cancer screening - pap obtained.    Screening mammogram up to date.    Discussed natural course of pelvic organ prolapse including various mgt options.  Discussed conservative measures, pessary, and surgery.  After an extensive discussion, pt opted for expectant management and will let us know if her POP becomes symptomatic.  Encourage Kegel exercises.      Encourage healthy lifestyle modifications, monthly self breast exams, and f/u with PCP for health maintenance.    Return 1 year for gynecologic exam, or sooner as needed.  All questions answered, pt voiced understanding.        Simon Vasquez MD

## 2023-06-14 ENCOUNTER — TELEPHONE (OUTPATIENT)
Dept: OBSTETRICS AND GYNECOLOGY | Facility: CLINIC | Age: 63
End: 2023-06-14
Payer: COMMERCIAL

## 2023-06-14 NOTE — LETTER
June 13, 2023      [facility] - [facility] GYN  [address]  [place] [address]  Phone: [phone]       Patient: Lulú Rosenthal   YOB: 1960  Date of Visit: 06/13/2023    To Whom It May Concern:    Ruddy Rosenthal  was at [facility] on 06/13/2023. The patient may return to work/school on 6/14/2023 with no restrictions. If you have any questions or concerns, or if I can be of further assistance, please do not hesitate to contact me.    Sincerely,    Vida Damon MA

## 2023-06-14 NOTE — TELEPHONE ENCOUNTER
Left message to let pt know her letter has been fixed and sent to her [facility] portal.       ----- Message from Sierra Abad sent at 6/14/2023  8:47 AM CDT -----  Regarding: patient call back  Type: Patient Call Back    Who called: Self     What is the request in detail: The return date for work is incorrect and needs to be corrected to say that she can go to work today 6/14/23- not the 13th     Can the clinic reply by [facility]? No     Would the patient rather a call back or a response via [facility]? Call     Best call back number: .[phone]

## 2023-06-16 ENCOUNTER — OFFICE VISIT (OUTPATIENT)
Dept: PRIMARY CARE CLINIC | Facility: CLINIC | Age: 63
End: 2023-06-16
Payer: COMMERCIAL

## 2023-06-16 VITALS
HEIGHT: 63 IN | BODY MASS INDEX: 36.6 KG/M2 | OXYGEN SATURATION: 99 % | SYSTOLIC BLOOD PRESSURE: 138 MMHG | TEMPERATURE: 99 F | DIASTOLIC BLOOD PRESSURE: 88 MMHG | WEIGHT: 206.56 LBS | HEART RATE: 86 BPM | RESPIRATION RATE: 17 BRPM

## 2023-06-16 DIAGNOSIS — Z23 ENCOUNTER FOR VACCINATION: ICD-10-CM

## 2023-06-16 DIAGNOSIS — R73.03 PREDIABETES: ICD-10-CM

## 2023-06-16 DIAGNOSIS — I10 ESSENTIAL HYPERTENSION: Primary | ICD-10-CM

## 2023-06-16 DIAGNOSIS — E66.01 CLASS 3 SEVERE OBESITY WITH BODY MASS INDEX (BMI) OF 40.0 TO 44.9 IN ADULT, UNSPECIFIED OBESITY TYPE, UNSPECIFIED WHETHER SERIOUS COMORBIDITY PRESENT: ICD-10-CM

## 2023-06-16 DIAGNOSIS — T78.40XA ALLERGY, INITIAL ENCOUNTER: ICD-10-CM

## 2023-06-16 DIAGNOSIS — G47.00 INSOMNIA, UNSPECIFIED TYPE: ICD-10-CM

## 2023-06-16 DIAGNOSIS — G89.29 CHRONIC MIDLINE LOW BACK PAIN: ICD-10-CM

## 2023-06-16 DIAGNOSIS — M17.12 ARTHRITIS OF LEFT KNEE: ICD-10-CM

## 2023-06-16 DIAGNOSIS — M54.50 CHRONIC MIDLINE LOW BACK PAIN: ICD-10-CM

## 2023-06-16 PROCEDURE — 1159F MED LIST DOCD IN RCRD: CPT | Mod: CPTII,S$GLB,, | Performed by: INTERNAL MEDICINE

## 2023-06-16 PROCEDURE — 3079F DIAST BP 80-89 MM HG: CPT | Mod: CPTII,S$GLB,, | Performed by: INTERNAL MEDICINE

## 2023-06-16 PROCEDURE — 3008F PR BODY MASS INDEX (BMI) DOCUMENTED: ICD-10-PCS | Mod: CPTII,S$GLB,, | Performed by: INTERNAL MEDICINE

## 2023-06-16 PROCEDURE — 3079F PR MOST RECENT DIASTOLIC BLOOD PRESSURE 80-89 MM HG: ICD-10-PCS | Mod: CPTII,S$GLB,, | Performed by: INTERNAL MEDICINE

## 2023-06-16 PROCEDURE — 1160F PR REVIEW ALL MEDS BY PRESCRIBER/CLIN PHARMACIST DOCUMENTED: ICD-10-PCS | Mod: CPTII,S$GLB,, | Performed by: INTERNAL MEDICINE

## 2023-06-16 PROCEDURE — 4010F ACE/ARB THERAPY RXD/TAKEN: CPT | Mod: CPTII,S$GLB,, | Performed by: INTERNAL MEDICINE

## 2023-06-16 PROCEDURE — 4010F PR ACE/ARB THEARPY RXD/TAKEN: ICD-10-PCS | Mod: CPTII,S$GLB,, | Performed by: INTERNAL MEDICINE

## 2023-06-16 PROCEDURE — 90471 IMMUNIZATION ADMIN: CPT | Mod: S$GLB,,, | Performed by: INTERNAL MEDICINE

## 2023-06-16 PROCEDURE — 90677 PNEUMOCOCCAL CONJUGATE VACCINE 20-VALENT: ICD-10-PCS | Mod: S$GLB,,, | Performed by: INTERNAL MEDICINE

## 2023-06-16 PROCEDURE — 3075F SYST BP GE 130 - 139MM HG: CPT | Mod: CPTII,S$GLB,, | Performed by: INTERNAL MEDICINE

## 2023-06-16 PROCEDURE — 99214 PR OFFICE/OUTPT VISIT, EST, LEVL IV, 30-39 MIN: ICD-10-PCS | Mod: 25,S$GLB,, | Performed by: INTERNAL MEDICINE

## 2023-06-16 PROCEDURE — 90677 PCV20 VACCINE IM: CPT | Mod: S$GLB,,, | Performed by: INTERNAL MEDICINE

## 2023-06-16 PROCEDURE — 1159F PR MEDICATION LIST DOCUMENTED IN MEDICAL RECORD: ICD-10-PCS | Mod: CPTII,S$GLB,, | Performed by: INTERNAL MEDICINE

## 2023-06-16 PROCEDURE — 99999 PR PBB SHADOW E&M-EST. PATIENT-LVL IV: ICD-10-PCS | Mod: PBBFAC,,, | Performed by: INTERNAL MEDICINE

## 2023-06-16 PROCEDURE — 3008F BODY MASS INDEX DOCD: CPT | Mod: CPTII,S$GLB,, | Performed by: INTERNAL MEDICINE

## 2023-06-16 PROCEDURE — 1160F RVW MEDS BY RX/DR IN RCRD: CPT | Mod: CPTII,S$GLB,, | Performed by: INTERNAL MEDICINE

## 2023-06-16 PROCEDURE — 99214 OFFICE O/P EST MOD 30 MIN: CPT | Mod: 25,S$GLB,, | Performed by: INTERNAL MEDICINE

## 2023-06-16 PROCEDURE — 3075F PR MOST RECENT SYSTOLIC BLOOD PRESS GE 130-139MM HG: ICD-10-PCS | Mod: CPTII,S$GLB,, | Performed by: INTERNAL MEDICINE

## 2023-06-16 PROCEDURE — 99999 PR PBB SHADOW E&M-EST. PATIENT-LVL IV: CPT | Mod: PBBFAC,,, | Performed by: INTERNAL MEDICINE

## 2023-06-16 PROCEDURE — 90471 PNEUMOCOCCAL CONJUGATE VACCINE 20-VALENT: ICD-10-PCS | Mod: S$GLB,,, | Performed by: INTERNAL MEDICINE

## 2023-06-16 RX ORDER — ETODOLAC 400 MG/1
400 TABLET, FILM COATED ORAL 2 TIMES DAILY PRN
Qty: 30 TABLET | Refills: 2 | Status: SHIPPED | OUTPATIENT
Start: 2023-06-16

## 2023-06-16 RX ORDER — POTASSIUM CHLORIDE 20 MEQ/1
20 TABLET, EXTENDED RELEASE ORAL 2 TIMES DAILY
Qty: 30 TABLET | Refills: 0 | Status: SHIPPED | OUTPATIENT
Start: 2023-06-16 | End: 2024-01-18 | Stop reason: SDUPTHER

## 2023-06-16 RX ORDER — LOSARTAN POTASSIUM 50 MG/1
50 TABLET ORAL DAILY
Qty: 90 TABLET | Refills: 2 | Status: SHIPPED | OUTPATIENT
Start: 2023-06-16 | End: 2024-01-18 | Stop reason: SDUPTHER

## 2023-06-16 RX ORDER — HYDROCHLOROTHIAZIDE 25 MG/1
25 TABLET ORAL DAILY
Qty: 90 TABLET | Refills: 1 | Status: SHIPPED | OUTPATIENT
Start: 2023-06-16 | End: 2023-11-27

## 2023-06-16 RX ORDER — SEMAGLUTIDE 2.68 MG/ML
2 INJECTION, SOLUTION SUBCUTANEOUS
Qty: 9 ML | Refills: 3 | Status: SHIPPED | OUTPATIENT
Start: 2023-06-16 | End: 2023-07-25 | Stop reason: SDUPTHER

## 2023-06-16 RX ORDER — ZOLPIDEM TARTRATE 10 MG/1
10 TABLET ORAL
Qty: 30 TABLET | Refills: 1 | Status: SHIPPED | OUTPATIENT
Start: 2023-06-16

## 2023-06-16 RX ORDER — CETIRIZINE HYDROCHLORIDE 10 MG/1
10 TABLET ORAL DAILY
Qty: 90 TABLET | Refills: 3 | Status: SHIPPED | OUTPATIENT
Start: 2023-06-16 | End: 2023-06-21 | Stop reason: SDUPTHER

## 2023-06-16 RX ORDER — TIZANIDINE 4 MG/1
4 TABLET ORAL 2 TIMES DAILY PRN
Qty: 60 TABLET | Refills: 1 | Status: SHIPPED | OUTPATIENT
Start: 2023-06-16 | End: 2024-01-18 | Stop reason: SDUPTHER

## 2023-06-16 NOTE — PROGRESS NOTES
Verified pt ID using name and . NICOLASA Daniels. Administered Prevnar 20 in Right Deltoid per physician order using aseptic technique. Aspirated and no blood return noted. Pt tolerated well with no adverse reactions noted.

## 2023-06-16 NOTE — PROGRESS NOTES
"Subjective:       Patient ID: Lulú Rosenthal is a 62 y.o. female.    Chief Complaint: Hypertension, Knee Pain (Left, Patient stated doing "a lot better"), and Medication Management (Question about increasing Ozempic)    HPI Pt visit today for routine f/u and refill medication she lost 30 lbs on ozempic with diet and increase physical activity no sob cp no COCHRAN no side effect from ozempic . Pt stilll with chronic nsomnia taking  5mg ambien qhs  Review of Systems   Constitutional:  Negative for unexpected weight change.   HENT:  Negative for congestion.    Eyes:  Negative for visual disturbance.   Respiratory:  Negative for shortness of breath.    Cardiovascular:  Negative for chest pain.   Gastrointestinal:  Negative for abdominal pain, constipation, diarrhea, nausea and vomiting.   Endocrine: Negative for polydipsia, polyphagia and polyuria.   Genitourinary:  Negative for difficulty urinating.   Musculoskeletal:  Negative for arthralgias.   Skin:  Negative for rash and wound.        bruises   Neurological:  Negative for dizziness, weakness and headaches.        H/O CVA   Hematological:  Does not bruise/bleed easily.        No anemia   Psychiatric/Behavioral:  Negative for dysphoric mood.      Objective:      Physical Exam  Vitals and nursing note reviewed.   Constitutional:       General: She is not in acute distress.     Appearance: She is well-developed.   HENT:      Head: Normocephalic and atraumatic.      Right Ear: External ear normal.      Left Ear: External ear normal.      Nose: Nose normal.      Mouth/Throat:      Pharynx: No oropharyngeal exudate.   Eyes:      Extraocular Movements: Extraocular movements intact.      Conjunctiva/sclera: Conjunctivae normal.      Pupils: Pupils are equal, round, and reactive to light.   Neck:      Thyroid: No thyromegaly.   Cardiovascular:      Rate and Rhythm: Normal rate and regular rhythm.      Heart sounds: Normal heart sounds. No murmur heard.    No friction rub. No " gallop.   Pulmonary:      Effort: Pulmonary effort is normal. No respiratory distress.      Breath sounds: Normal breath sounds. No wheezing.   Abdominal:      General: Bowel sounds are normal. There is no distension.      Palpations: Abdomen is soft.      Tenderness: There is no abdominal tenderness.   Musculoskeletal:         General: No tenderness or deformity. Normal range of motion.      Cervical back: Normal range of motion and neck supple.   Lymphadenopathy:      Cervical: No cervical adenopathy.   Skin:     General: Skin is warm and dry.      Findings: No erythema or rash.   Neurological:      Mental Status: She is alert and oriented to person, place, and time.   Psychiatric:         Mood and Affect: Mood normal.         Thought Content: Thought content normal.         Judgment: Judgment normal.       Assessment:       1. Essential hypertension    2. Arthritis of left knee    3. Chronic midline low back pain    4. Insomnia, unspecified type    5. Allergy, initial encounter    6. Class 3 severe obesity with body mass index (BMI) of 40.0 to 44.9 in adult, unspecified obesity type, unspecified whether serious comorbidity present    7. Prediabetes    8. Encounter for vaccination        Plan:       Essential hypertension  Comments:  fairly controlled  Orders:  -     hydroCHLOROthiazide (HYDRODIURIL) 25 MG tablet; Take 1 tablet (25 mg total) by mouth once daily. 1 tablet  Dispense: 90 tablet; Refill: 1  -     losartan (COZAAR) 50 MG tablet; Take 1 tablet (50 mg total) by mouth once daily.  Dispense: 90 tablet; Refill: 2    Arthritis of left knee  -     etodolac (LODINE) 400 MG tablet; Take 1 tablet (400 mg total) by mouth 2 (two) times daily as needed (arthritis). Arthritis knee  Dispense: 30 tablet; Refill: 2    Chronic midline low back pain  -     tiZANidine (ZANAFLEX) 4 MG tablet; Take 1 tablet (4 mg total) by mouth 2 (two) times daily as needed (muscl spasm).  Dispense: 60 tablet; Refill: 1    Insomnia,  unspecified type  -     zolpidem (AMBIEN) 10 mg Tab; Take 1 tablet (10 mg total) by mouth as needed (insomnia).  Dispense: 30 tablet; Refill: 1    Allergy, initial encounter  -     cetirizine (ZYRTEC) 10 MG tablet; Take 1 tablet (10 mg total) by mouth once daily.  Dispense: 90 tablet; Refill: 3    Class 3 severe obesity with body mass index (BMI) of 40.0 to 44.9 in adult, unspecified obesity type, unspecified whether serious comorbidity present  -     semaglutide (OZEMPIC) 2 mg/dose (8 mg/3 mL) PnIj; Inject 2 mg into the skin every 7 days.  Dispense: 9 mL; Refill: 3    Prediabetes  -     semaglutide (OZEMPIC) 2 mg/dose (8 mg/3 mL) PnIj; Inject 2 mg into the skin every 7 days.  Dispense: 9 mL; Refill: 3    Encounter for vaccination  -     (In Office Administered) Pneumococcal Conjugate Vaccine (20 Valent) (IM)    Other orders  -     potassium chloride SA (K-DUR,KLOR-CON) 20 MEQ tablet; Take 1 tablet (20 mEq total) by mouth 2 (two) times daily.  Dispense: 30 tablet; Refill: 0        Medication List with Changes/Refills   New Medications    SEMAGLUTIDE (OZEMPIC) 2 MG/DOSE (8 MG/3 ML) PNIJ    Inject 2 mg into the skin every 7 days.   Changed and/or Refilled Medications    Modified Medication Previous Medication    CETIRIZINE (ZYRTEC) 10 MG TABLET cetirizine (ZYRTEC) 10 MG tablet       Take 1 tablet (10 mg total) by mouth once daily.    Take 10 mg by mouth once daily.    ETODOLAC (LODINE) 400 MG TABLET etodolac (LODINE) 400 MG tablet       Take 1 tablet (400 mg total) by mouth 2 (two) times daily as needed (arthritis). Arthritis knee    Take 1 tablet (400 mg total) by mouth 2 (two) times daily as needed (arthritis). Arthritis knee    HYDROCHLOROTHIAZIDE (HYDRODIURIL) 25 MG TABLET hydroCHLOROthiazide (HYDRODIURIL) 25 MG tablet       Take 1 tablet (25 mg total) by mouth once daily. 1 tablet    Take 1 tablet (25 mg total) by mouth once daily. 1 tablet    LOSARTAN (COZAAR) 50 MG TABLET losartan (COZAAR) 50 MG tablet        Take 1 tablet (50 mg total) by mouth once daily.    Take 1 tablet (50 mg total) by mouth once daily.    POTASSIUM CHLORIDE SA (K-DUR,KLOR-CON) 20 MEQ TABLET potassium chloride SA (K-DUR,KLOR-CON) 20 MEQ tablet       Take 1 tablet (20 mEq total) by mouth 2 (two) times daily.    Take 1 tablet (20 mEq total) by mouth 2 (two) times daily.    TIZANIDINE (ZANAFLEX) 4 MG TABLET tiZANidine (ZANAFLEX) 4 MG tablet       Take 1 tablet (4 mg total) by mouth 2 (two) times daily as needed (muscl spasm).    Take 1 tablet (4 mg total) by mouth 2 (two) times daily as needed (muscl spasm).    ZOLPIDEM (AMBIEN) 10 MG TAB zolpidem (AMBIEN) 10 mg Tab       Take 1 tablet (10 mg total) by mouth as needed (insomnia).    Take 1 tablet (10 mg total) by mouth as needed (insomnia).   Discontinued Medications    METHOCARBAMOL (ROBAXIN) 500 MG TAB    Take 500 mg by mouth 2 (two) times daily as needed.    OZEMPIC 1 MG/DOSE (4 MG/3 ML)    INJECT 1 MG EVERY 7 DAYS

## 2023-06-16 NOTE — LETTER
June 16, 2023      [facility] [address]  [address], [facility] [address]  [place] [address]  Phone: [phone]  Fax: [phone]       Patient: Lulú Rosenthal   YOB: 1960  Date of Visit: 06/16/2023    To Whom It May Concern:    Ruddy Rosenthal  was at [facility] on 06/16/2023. The patient may return to work 06/19/2023 with restrictions. If you have any questions or concerns, or if I can be of further assistance, please do not hesitate to contact me.    Sincerely,    Ran Asencio MD

## 2023-06-17 LAB
FINAL PATHOLOGIC DIAGNOSIS: NORMAL
Lab: NORMAL

## 2023-06-21 DIAGNOSIS — T78.40XA ALLERGY, INITIAL ENCOUNTER: ICD-10-CM

## 2023-06-21 NOTE — TELEPHONE ENCOUNTER
No care due was identified.  [facility] Embedded Care Due Messages. Reference number: [id number].   6/21/2023 10:05:24 AM CDT

## 2023-06-21 NOTE — TELEPHONE ENCOUNTER
Spoke with patient in regards to her medication being sent to the wrong pharmacy. Patient said her medication was suppose to be sent to a local pharmacy.

## 2023-06-21 NOTE — TELEPHONE ENCOUNTER
----- Message from Aracelis Julio sent at 6/21/2023  9:49 AM CDT -----  Contact: Pt [phone]  She said [facility] because it was supposed to go to the local pharmacy for a better price:    Is this a refill or new RX: Refill  RX name and strength cetirizine (ZYRTEC) 10 MG tablet  Is this a 30 day or 90 day RX: 90 day  Pharmacy name and phone # [id number] - [place], [place] - [address] [place] [address] AT [address] Phone: [phone] Fax:  [phone]

## 2023-06-22 RX ORDER — CETIRIZINE HYDROCHLORIDE 10 MG/1
10 TABLET ORAL DAILY
Qty: 30 TABLET | Refills: 3 | Status: SHIPPED | OUTPATIENT
Start: 2023-06-22 | End: 2024-01-18 | Stop reason: SDUPTHER

## 2023-07-25 DIAGNOSIS — R73.03 PREDIABETES: ICD-10-CM

## 2023-07-25 DIAGNOSIS — E66.01 CLASS 3 SEVERE OBESITY WITH BODY MASS INDEX (BMI) OF 40.0 TO 44.9 IN ADULT, UNSPECIFIED OBESITY TYPE, UNSPECIFIED WHETHER SERIOUS COMORBIDITY PRESENT: ICD-10-CM

## 2023-07-25 NOTE — TELEPHONE ENCOUNTER
Care Due:                  Date            Visit Type   Department     Provider  --------------------------------------------------------------------------------                                EP -                              [facility]      [facility]  Last Visit: 06-      CARE ([facility])   PRIMARY CARE   Ran Asencio                              [facility]                              [facility]  Next Visit: 12-      CARE ([facility])   PRIMARY CARE   Ran Asencio                                                            Last  Test          Frequency    Reason                     Performed    Due Date  --------------------------------------------------------------------------------    HBA1C.......  6 months...  semaglutide..............  09- 03-    Health [facility] Embedded Care Due Messages. Reference number: [id number].   7/25/2023 12:24:38 PM CDT

## 2023-07-25 NOTE — TELEPHONE ENCOUNTER
----- Message from Yani Resendiz sent at 7/25/2023  9:36 AM CDT -----  Contact: Pt [phone]  Requesting an RX refill or new RX.  Is this a refill or new RX: refill  RX name and strength (copy/paste from chart):  semaglutide (OZEMPIC) 2 mg/dose (8 mg/3 mL) PnIj  Is this a 30 day or 90 day RX:   Pharmacy name and phone # (copy/paste from chart):  [facility] - [address]   Phone:  [phone]  Fax:  [phone]        The doctors have asked that we provide their patients with the following 2 reminders -- prescription refills can take up to 72 hours, and a friendly reminder that in the future you can use your [facility] account to request refills: yes   Pt states this medication needs a PA # [phone]

## 2023-07-26 ENCOUNTER — TELEPHONE (OUTPATIENT)
Dept: PRIMARY CARE CLINIC | Facility: CLINIC | Age: 63
End: 2023-07-26
Payer: COMMERCIAL

## 2023-07-26 DIAGNOSIS — R73.03 PREDIABETES: ICD-10-CM

## 2023-07-26 DIAGNOSIS — E66.01 CLASS 3 SEVERE OBESITY WITH BODY MASS INDEX (BMI) OF 40.0 TO 44.9 IN ADULT, UNSPECIFIED OBESITY TYPE, UNSPECIFIED WHETHER SERIOUS COMORBIDITY PRESENT: Primary | ICD-10-CM

## 2023-07-26 RX ORDER — SEMAGLUTIDE 1.34 MG/ML
1 INJECTION, SOLUTION SUBCUTANEOUS
Qty: 3 ML | Refills: 5 | Status: SHIPPED | OUTPATIENT
Start: 2023-07-26 | End: 2024-01-18

## 2023-07-26 RX ORDER — SEMAGLUTIDE 2.68 MG/ML
2 INJECTION, SOLUTION SUBCUTANEOUS
Qty: 9 ML | Refills: 3 | Status: SHIPPED | OUTPATIENT
Start: 2023-07-26 | End: 2023-07-26

## 2023-07-26 NOTE — TELEPHONE ENCOUNTER
----- Message from Angélica Blair sent at 7/26/2023 10:45 AM CDT -----  Contact: [phone]  2nd message        Pt would like a callback to go over a few issues she is having with her prescriptions for Ozempic. Please call. Pt is using   [email or web address] - [place], [place] - [address] [place] [id number]  Phone: [phone] Fax: [phone]      Per pt, this is her second message to the office.           Thank you

## 2023-07-26 NOTE — TELEPHONE ENCOUNTER
Spoke wit patient in regards to her the ozempic 8 mg is on back order and the shipment is not coming in until 08/31/2023(possibly). Patient want to know do she go back to the 4 mg ozempic until the shipment come in? Please advise so I can complete the PA do to her insurance change.         [facility] for the PA  [phone]

## 2023-08-07 ENCOUNTER — TELEPHONE (OUTPATIENT)
Dept: PRIMARY CARE CLINIC | Facility: CLINIC | Age: 63
End: 2023-08-07
Payer: COMMERCIAL

## 2023-08-07 NOTE — TELEPHONE ENCOUNTER
Called RX benefits to complete a PA and it had to be done on the website. I completed the PA and they said a decision can take up to 3 to 6 days. The PA ID number is [id number].     Called patient to inform her that the PA was completed and it will take RX benefits 3 to 6 days to make a decision. Patient verbalized understanding.

## 2023-08-07 NOTE — TELEPHONE ENCOUNTER
----- Message from Pauline Miles sent at 8/7/2023 10:46 AM CDT -----  Contact: Self/[phone]  1MEDICALADVICE     Patient is calling for Medical Advice regarding: pt calling to check the status of a PA for her semaglutide (OZEMPIC) 1 mg/dose (4 mg/3 mL)      Would like response via [employer]:  No , call patient back with an update     Comments: pt stated that she is calling to check the status of a PA for the semaglutide (OZEMPIC) 1 mg/dose (4 mg/3 mL) pt stated that she still needs a PA for the Ozempic no matter what dosage she is prescribed she still needs the PA in order to be able to get her medication, pt stated that she really needs the medication taken care of asap because pt has been trying to get the medication since June and she is due in August      The PA needs to be submitted to Rx Benefits at: 5     [facility] at: [phone]

## 2023-08-10 ENCOUNTER — TELEPHONE (OUTPATIENT)
Dept: PRIMARY CARE CLINIC | Facility: CLINIC | Age: 63
End: 2023-08-10
Payer: COMMERCIAL

## 2023-08-10 NOTE — TELEPHONE ENCOUNTER
Spoke with patient in regards to her PA that was sent to RX benefits. Patient was informed that the PA was completed on 08/07/2023 and that they said the decision can take up 3 to 6 days. Patient was given the PA ID number because they told her that they do not have any PA in the system.

## 2023-08-10 NOTE — TELEPHONE ENCOUNTER
----- Message from Vandana Mike sent at 8/10/2023 11:55 AM CDT -----  Contact: [phone]  Pt is calling she states she has told the office to put urgent on her prescription refill for semaglutide (OZEMPIC) 1 mg/dose (4 mg/3 mL so she can get it in 1 to 3 days instead of 8 to 9 days [facility] she states the rx benefit dept has told her they need a prior auth and the rx dept has faxed this over to the office for you please give return call ASAP

## 2023-08-14 ENCOUNTER — TELEPHONE (OUTPATIENT)
Dept: PRIMARY CARE CLINIC | Facility: CLINIC | Age: 63
End: 2023-08-14
Payer: COMMERCIAL

## 2023-08-14 NOTE — TELEPHONE ENCOUNTER
----- Message from Jocelyn Lombardo sent at 8/14/2023 10:07 AM CDT -----  Contact: Pt [phone]  Patient is requesting to speak to Susie concerning the PA for Ozempic    Please call and advise.    Thank You

## 2023-08-14 NOTE — TELEPHONE ENCOUNTER
Spoke with patient in regards to her decision on her medication ozempic from her insurance and the patient was denied.

## 2023-09-18 ENCOUNTER — PATIENT MESSAGE (OUTPATIENT)
Dept: PRIMARY CARE CLINIC | Facility: CLINIC | Age: 63
End: 2023-09-18
Payer: COMMERCIAL

## 2023-10-03 ENCOUNTER — TELEPHONE (OUTPATIENT)
Dept: PRIMARY CARE CLINIC | Facility: CLINIC | Age: 63
End: 2023-10-03
Payer: COMMERCIAL

## 2023-10-03 DIAGNOSIS — Z12.31 ENCOUNTER FOR SCREENING MAMMOGRAM FOR MALIGNANT NEOPLASM OF BREAST: Primary | ICD-10-CM

## 2023-10-03 NOTE — TELEPHONE ENCOUNTER
----- Message from Aracelis Kim sent at 10/3/2023  8:14 AM CDT -----  Contact: Pt [phone]  Caller is requesting to schedule their annual screening mammogram appointment. Order is not listed in Epic.  Please enter order and contact patient to schedule.    Thank you

## 2023-10-18 ENCOUNTER — PATIENT MESSAGE (OUTPATIENT)
Dept: CARDIOLOGY | Facility: CLINIC | Age: 63
End: 2023-10-18
Payer: COMMERCIAL

## 2023-11-20 ENCOUNTER — HOSPITAL ENCOUNTER (OUTPATIENT)
Dept: RADIOLOGY | Facility: HOSPITAL | Age: 63
Discharge: HOME | End: 2023-11-20
Attending: INTERNAL MEDICINE
Payer: COMMERCIAL

## 2023-11-20 VITALS — WEIGHT: 199 LBS | BODY MASS INDEX: 35.26 KG/M2 | HEIGHT: 63 IN

## 2023-11-20 DIAGNOSIS — Z12.31 ENCOUNTER FOR SCREENING MAMMOGRAM FOR MALIGNANT NEOPLASM OF BREAST: ICD-10-CM

## 2023-11-20 PROCEDURE — 77063 MAMMO DIGITAL SCREENING BILAT WITH TOMO: ICD-10-PCS | Mod: 26,,, | Performed by: RADIOLOGY

## 2023-11-20 PROCEDURE — 77063 BREAST TOMOSYNTHESIS BI: CPT | Mod: 26,,, | Performed by: RADIOLOGY

## 2023-11-20 PROCEDURE — 77067 MAMMO DIGITAL SCREENING BILAT WITH TOMO: ICD-10-PCS | Mod: 26,,, | Performed by: RADIOLOGY

## 2023-11-20 PROCEDURE — 77067 SCR MAMMO BI INCL CAD: CPT | Mod: 26,,, | Performed by: RADIOLOGY

## 2023-11-20 PROCEDURE — 77067 SCR MAMMO BI INCL CAD: CPT | Mod: TC

## 2023-11-27 DIAGNOSIS — I10 ESSENTIAL HYPERTENSION: ICD-10-CM

## 2023-11-27 RX ORDER — HYDROCHLOROTHIAZIDE 25 MG/1
25 TABLET ORAL DAILY
Qty: 90 TABLET | Refills: 1 | Status: SHIPPED | OUTPATIENT
Start: 2023-11-27 | End: 2024-01-18 | Stop reason: SDUPTHER

## 2023-11-27 NOTE — TELEPHONE ENCOUNTER
Care Due:                  Date            Visit Type   Department     Provider  --------------------------------------------------------------------------------                                EP -                              PRIMARY      [facility]  Last Visit: 06-      CARE ([facility])   PRIMARY CARE   Ran Asencio                              [facility]                              [facility]  Next Visit: 12-      CARE ([facility])   PRIMARY CARE   Ran Asencio                                                            Last  Test          Frequency    Reason                     Performed    Due Date  --------------------------------------------------------------------------------    HBA1C.......  6 months...  semaglutide..............  09- 03-    Health [facility] Embedded Care Due Messages. Reference number: [id number].   11/27/2023 1:09:48 AM CST

## 2023-11-27 NOTE — TELEPHONE ENCOUNTER
Provider Staff:  Action required for this patient    Requires labs      Please see care gap opportunities below in Care Due Message.    Thanks!  [facility]     Appointments      Date Provider   Last Visit   6/16/2023 Ran Asencio MD   Next Visit   12/15/2023 Ran Asencio MD     Refill Decision Note   Lulú Rosenthal  is requesting a refill authorization.  Brief Assessment and Rationale for Refill:        Medication Therapy Plan:         Comments:     Note composed:3:59 PM 11/27/2023

## 2024-01-16 ENCOUNTER — TELEPHONE (OUTPATIENT)
Dept: PRIMARY CARE CLINIC | Facility: CLINIC | Age: 64
End: 2024-01-16
Payer: COMMERCIAL

## 2024-01-16 DIAGNOSIS — Z13.6 ENCOUNTER FOR LIPID SCREENING FOR CARDIOVASCULAR DISEASE: Primary | ICD-10-CM

## 2024-01-16 DIAGNOSIS — Z00.00 ANNUAL PHYSICAL EXAM: ICD-10-CM

## 2024-01-16 DIAGNOSIS — Z13.220 ENCOUNTER FOR LIPID SCREENING FOR CARDIOVASCULAR DISEASE: Primary | ICD-10-CM

## 2024-01-16 NOTE — TELEPHONE ENCOUNTER
Pt asked if labs can get sent in.  Informed the patient that I will put labs in   Verfied with provider and sign labs in

## 2024-01-16 NOTE — TELEPHONE ENCOUNTER
----- Message from Smooth Nascimento sent at 1/16/2024  8:32 AM CST -----  Contact: [phone]@[email or web address]  Good morning patient would like to request lab work order for her upcoming apt on 1/18. Patient says she is at the lab right now just waiting on the orders. Please give patient a call back  [phone]

## 2024-01-18 ENCOUNTER — OFFICE VISIT (OUTPATIENT)
Dept: PRIMARY CARE CLINIC | Facility: CLINIC | Age: 64
End: 2024-01-18
Payer: COMMERCIAL

## 2024-01-18 ENCOUNTER — TELEPHONE (OUTPATIENT)
Dept: PRIMARY CARE CLINIC | Facility: CLINIC | Age: 64
End: 2024-01-18
Payer: COMMERCIAL

## 2024-01-18 VITALS
SYSTOLIC BLOOD PRESSURE: 138 MMHG | HEIGHT: 63 IN | DIASTOLIC BLOOD PRESSURE: 84 MMHG | OXYGEN SATURATION: 99 % | HEART RATE: 84 BPM | RESPIRATION RATE: 18 BRPM | WEIGHT: 212.75 LBS | BODY MASS INDEX: 37.7 KG/M2

## 2024-01-18 DIAGNOSIS — G89.29 CHRONIC MIDLINE LOW BACK PAIN: ICD-10-CM

## 2024-01-18 DIAGNOSIS — T78.40XA ALLERGY, INITIAL ENCOUNTER: ICD-10-CM

## 2024-01-18 DIAGNOSIS — R05.1 ACUTE COUGH: ICD-10-CM

## 2024-01-18 DIAGNOSIS — R73.03 PREDIABETES: ICD-10-CM

## 2024-01-18 DIAGNOSIS — I10 ESSENTIAL HYPERTENSION: ICD-10-CM

## 2024-01-18 DIAGNOSIS — E66.01 CLASS 3 SEVERE OBESITY WITH BODY MASS INDEX (BMI) OF 40.0 TO 44.9 IN ADULT, UNSPECIFIED OBESITY TYPE, UNSPECIFIED WHETHER SERIOUS COMORBIDITY PRESENT: ICD-10-CM

## 2024-01-18 DIAGNOSIS — G47.00 INSOMNIA, UNSPECIFIED TYPE: ICD-10-CM

## 2024-01-18 DIAGNOSIS — M54.50 CHRONIC MIDLINE LOW BACK PAIN: ICD-10-CM

## 2024-01-18 DIAGNOSIS — J40 BRONCHITIS: Primary | ICD-10-CM

## 2024-01-18 PROCEDURE — 1159F MED LIST DOCD IN RCRD: CPT | Mod: CPTII,S$GLB,, | Performed by: INTERNAL MEDICINE

## 2024-01-18 PROCEDURE — 99214 OFFICE O/P EST MOD 30 MIN: CPT | Mod: S$GLB,,, | Performed by: INTERNAL MEDICINE

## 2024-01-18 PROCEDURE — 3075F SYST BP GE 130 - 139MM HG: CPT | Mod: CPTII,S$GLB,, | Performed by: INTERNAL MEDICINE

## 2024-01-18 PROCEDURE — 1160F RVW MEDS BY RX/DR IN RCRD: CPT | Mod: CPTII,S$GLB,, | Performed by: INTERNAL MEDICINE

## 2024-01-18 PROCEDURE — 3008F BODY MASS INDEX DOCD: CPT | Mod: CPTII,S$GLB,, | Performed by: INTERNAL MEDICINE

## 2024-01-18 PROCEDURE — 3079F DIAST BP 80-89 MM HG: CPT | Mod: CPTII,S$GLB,, | Performed by: INTERNAL MEDICINE

## 2024-01-18 PROCEDURE — 3044F HG A1C LEVEL LT 7.0%: CPT | Mod: CPTII,S$GLB,, | Performed by: INTERNAL MEDICINE

## 2024-01-18 PROCEDURE — 4010F ACE/ARB THERAPY RXD/TAKEN: CPT | Mod: CPTII,S$GLB,, | Performed by: INTERNAL MEDICINE

## 2024-01-18 PROCEDURE — 99999 PR PBB SHADOW E&M-EST. PATIENT-LVL IV: CPT | Mod: PBBFAC,,, | Performed by: INTERNAL MEDICINE

## 2024-01-18 RX ORDER — PROMETHAZINE HYDROCHLORIDE 6.25 MG/5ML
6.25 SYRUP ORAL EVERY 6 HOURS PRN
Qty: 120 ML | Refills: 0 | Status: SHIPPED | OUTPATIENT
Start: 2024-01-18 | End: 2024-01-18

## 2024-01-18 RX ORDER — AZITHROMYCIN 250 MG/1
TABLET, FILM COATED ORAL
Qty: 6 TABLET | Refills: 0 | Status: SHIPPED | OUTPATIENT
Start: 2024-01-18 | End: 2024-01-22

## 2024-01-18 RX ORDER — TIZANIDINE 4 MG/1
4 TABLET ORAL 2 TIMES DAILY PRN
Qty: 60 TABLET | Refills: 1 | Status: SHIPPED | OUTPATIENT
Start: 2024-01-18

## 2024-01-18 RX ORDER — PROMETHAZINE HYDROCHLORIDE AND DEXTROMETHORPHAN HYDROBROMIDE 6.25; 15 MG/5ML; MG/5ML
5 SYRUP ORAL EVERY 4 HOURS PRN
Qty: 120 ML | Refills: 0 | Status: SHIPPED | OUTPATIENT
Start: 2024-01-18 | End: 2024-01-28

## 2024-01-18 RX ORDER — LOSARTAN POTASSIUM 50 MG/1
50 TABLET ORAL DAILY
Qty: 90 TABLET | Refills: 2 | Status: SHIPPED | OUTPATIENT
Start: 2024-01-18

## 2024-01-18 RX ORDER — HYDROCHLOROTHIAZIDE 25 MG/1
25 TABLET ORAL DAILY
Qty: 90 TABLET | Refills: 1 | Status: SHIPPED | OUTPATIENT
Start: 2024-01-18

## 2024-01-18 RX ORDER — CETIRIZINE HYDROCHLORIDE 10 MG/1
10 TABLET ORAL DAILY
Qty: 30 TABLET | Refills: 3 | Status: SHIPPED | OUTPATIENT
Start: 2024-01-18

## 2024-01-18 RX ORDER — POTASSIUM CHLORIDE 20 MEQ/1
20 TABLET, EXTENDED RELEASE ORAL 2 TIMES DAILY
Qty: 30 TABLET | Refills: 0 | Status: SHIPPED | OUTPATIENT
Start: 2024-01-18 | End: 2024-01-31

## 2024-01-18 NOTE — PROGRESS NOTES
Subjective:       Patient ID: Lulú Rosenthal is a 63 y.o. female.    Chief Complaint: Follow-up    HPI  Pt visit today for f/u she is doing well except having coughing confestion for several worse at night with greenish phlegm no sob cp no COCHRAN worse at night no fever chill night sweat pt had h/o sinuse infection in past worse at night  Review of Systems   Constitutional:  Negative for activity change and unexpected weight change.   HENT:  Positive for rhinorrhea. Negative for hearing loss and trouble swallowing.    Eyes:  Positive for discharge. Negative for visual disturbance.   Respiratory:  Negative for chest tightness and wheezing.    Cardiovascular:  Positive for chest pain. Negative for palpitations.   Gastrointestinal:  Negative for blood in stool, constipation, diarrhea and vomiting.   Endocrine: Negative for polydipsia and polyuria.   Genitourinary:  Negative for difficulty urinating, dysuria, hematuria and menstrual problem.   Musculoskeletal:  Negative for arthralgias, joint swelling and neck pain.   Neurological:  Negative for weakness and headaches.   Psychiatric/Behavioral:  Negative for confusion and dysphoric mood.        Objective:      Physical Exam  Vitals and nursing note reviewed.   Constitutional:       General: She is not in acute distress.     Appearance: She is well-developed.   HENT:      Head: Normocephalic and atraumatic.      Right Ear: External ear normal.      Left Ear: External ear normal.      Nose: Nose normal.      Mouth/Throat:      Pharynx: No oropharyngeal exudate.   Eyes:      Extraocular Movements: Extraocular movements intact.      Conjunctiva/sclera: Conjunctivae normal.      Pupils: Pupils are equal, round, and reactive to light.   Neck:      Thyroid: No thyromegaly.   Cardiovascular:      Rate and Rhythm: Normal rate and regular rhythm.      Heart sounds: Normal heart sounds. No murmur heard.     No friction rub. No gallop.   Pulmonary:      Effort: Pulmonary effort is  normal. No respiratory distress.      Breath sounds: Normal breath sounds. No wheezing.   Abdominal:      General: Bowel sounds are normal. There is no distension.      Palpations: Abdomen is soft.      Tenderness: There is no abdominal tenderness.   Musculoskeletal:         General: No tenderness or deformity. Normal range of motion.      Cervical back: Normal range of motion and neck supple.   Lymphadenopathy:      Cervical: No cervical adenopathy.   Skin:     General: Skin is warm and dry.      Findings: No erythema or rash.   Neurological:      Mental Status: She is alert and oriented to person, place, and time.   Psychiatric:         Mood and Affect: Mood normal.         Thought Content: Thought content normal.         Judgment: Judgment normal.         Assessment:       1. Bronchitis    2. Allergy, initial encounter    3. Essential hypertension    4. Chronic midline low back pain    5. Insomnia, unspecified type    6. Acute cough    7. Prediabetes    8. Class 3 severe obesity with body mass index (BMI) of 40.0 to 44.9 in adult, unspecified obesity type, unspecified whether serious comorbidity present        Plan:       Bronchitis  -     azithromycin (Z-IRVIN) 250 MG tablet; 2 tabs by mouth day 1, then 1 tab by mouth daily x 4 days  Dispense: 6 tablet; Refill: 0  -     Discontinue: promethazine (PHENERGAN) 6.25 mg/5 mL syrup; Take 5 mLs (6.25 mg total) by mouth every 6 (six) hours as needed.  Dispense: 120 mL; Refill: 0  -     promethazine-dextromethorphan (PROMETHAZINE-DM) 6.25-15 mg/5 mL Syrp; Take 5 mLs by mouth every 4 (four) hours as needed (coughing).  Dispense: 120 mL; Refill: 0    Allergy, initial encounter  -     cetirizine (ZYRTEC) 10 MG tablet; Take 1 tablet (10 mg total) by mouth once daily.  Dispense: 30 tablet; Refill: 3    Essential hypertension  Comments:  fairly controlled  Orders:  -     hydroCHLOROthiazide (HYDRODIURIL) 25 MG tablet; Take 1 tablet (25 mg total) by mouth once daily.  Dispense:  90 tablet; Refill: 1  -     losartan (COZAAR) 50 MG tablet; Take 1 tablet (50 mg total) by mouth once daily.  Dispense: 90 tablet; Refill: 2    Chronic midline low back pain  Comments:  continue with local tx and medications prn  Orders:  -     tiZANidine (ZANAFLEX) 4 MG tablet; Take 1 tablet (4 mg total) by mouth 2 (two) times daily as needed (muscl spasm).  Dispense: 60 tablet; Refill: 1    Insomnia, unspecified type  Comments:  advice pt try OTC meds    Acute cough  -     Discontinue: promethazine (PHENERGAN) 6.25 mg/5 mL syrup; Take 5 mLs (6.25 mg total) by mouth every 6 (six) hours as needed.  Dispense: 120 mL; Refill: 0    Prediabetes  -     tirzepatide 7.5 mg/0.5 mL PnIj; Inject 7.5 mg into the skin every 7 days.  Dispense: 4 Pen; Refill: 5    Class 3 severe obesity with body mass index (BMI) of 40.0 to 44.9 in adult, unspecified obesity type, unspecified whether serious comorbidity present  Comments:  contiue with diet exercise and moujaro  Orders:  -     tirzepatide 7.5 mg/0.5 mL PnIj; Inject 7.5 mg into the skin every 7 days.  Dispense: 4 Pen; Refill: 5    Other orders  -     potassium chloride SA (K-DUR,KLOR-CON) 20 MEQ tablet; Take 1 tablet (20 mEq total) by mouth 2 (two) times daily.  Dispense: 30 tablet; Refill: 0        Medication List with Changes/Refills   New Medications    AZITHROMYCIN (Z-IRVIN) 250 MG TABLET    2 tabs by mouth day 1, then 1 tab by mouth daily x 4 days    PROMETHAZINE-DEXTROMETHORPHAN (PROMETHAZINE-DM) 6.25-15 MG/5 ML SYRP    Take 5 mLs by mouth every 4 (four) hours as needed (coughing).    TIRZEPATIDE 7.5 MG/0.5 ML PNIJ    Inject 7.5 mg into the skin every 7 days.   Current Medications    ETODOLAC (LODINE) 400 MG TABLET    Take 1 tablet (400 mg total) by mouth 2 (two) times daily as needed (arthritis). Arthritis knee    ZOLPIDEM (AMBIEN) 10 MG TAB    Take 1 tablet (10 mg total) by mouth as needed (insomnia).   Changed and/or Refilled Medications    Modified Medication Previous  Medication    CETIRIZINE (ZYRTEC) 10 MG TABLET cetirizine (ZYRTEC) 10 MG tablet       Take 1 tablet (10 mg total) by mouth once daily.    Take 1 tablet (10 mg total) by mouth once daily.    HYDROCHLOROTHIAZIDE (HYDRODIURIL) 25 MG TABLET hydroCHLOROthiazide (HYDRODIURIL) 25 MG tablet       Take 1 tablet (25 mg total) by mouth once daily.    Take 1 tablet (25 mg total) by mouth once daily.    LOSARTAN (COZAAR) 50 MG TABLET losartan (COZAAR) 50 MG tablet       Take 1 tablet (50 mg total) by mouth once daily.    Take 1 tablet (50 mg total) by mouth once daily.    POTASSIUM CHLORIDE SA (K-DUR,KLOR-CON) 20 MEQ TABLET potassium chloride SA (K-DUR,KLOR-CON) 20 MEQ tablet       Take 1 tablet (20 mEq total) by mouth 2 (two) times daily.    Take 1 tablet (20 mEq total) by mouth 2 (two) times daily.    TIZANIDINE (ZANAFLEX) 4 MG TABLET tiZANidine (ZANAFLEX) 4 MG tablet       Take 1 tablet (4 mg total) by mouth 2 (two) times daily as needed (muscl spasm).    Take 1 tablet (4 mg total) by mouth 2 (two) times daily as needed (muscl spasm).   Discontinued Medications    SEMAGLUTIDE (OZEMPIC) 1 MG/DOSE (4 MG/3 ML)    Inject 1 mg into the skin every 7 days.

## 2024-01-18 NOTE — TELEPHONE ENCOUNTER
----- Message from Smooth Nascimento sent at 1/18/2024  3:44 PM CST -----  Contact: [phone]@patient  Good afternoon patient would like to let the doc know that one of her med needs prior authorization. Please give patient a call back [phone]

## 2024-01-30 NOTE — TELEPHONE ENCOUNTER
No care due was identified.  [facility] Embedded Care Due Messages. Reference number: [id number].   1/30/2024 3:13:38 AM CST

## 2024-01-30 NOTE — TELEPHONE ENCOUNTER
Refill Routing Note   Medication(s) are not appropriate for processing by [facility] for the following reason(s):        New or recently adjusted medication: recent restart per adherence reporting    [facility] action(s):  Defer      Medication Therapy Plan:         Appointments  past 12m or future 3m with PCP    Date Provider   Last Visit   1/18/2024 Ran Asencio MD   Next Visit   4/18/2024 Ran Asencio MD   ED visits in past 90 days: 0        Note composed:10:53 AM 01/30/2024

## 2024-01-31 RX ORDER — POTASSIUM CHLORIDE 20 MEQ/1
20 TABLET, EXTENDED RELEASE ORAL 2 TIMES DAILY
Qty: 30 TABLET | Refills: 0 | Status: SHIPPED | OUTPATIENT
Start: 2024-01-31 | End: 2024-02-21

## 2024-02-21 RX ORDER — POTASSIUM CHLORIDE 20 MEQ/1
20 TABLET, EXTENDED RELEASE ORAL 2 TIMES DAILY
Qty: 180 TABLET | Refills: 3 | Status: SHIPPED | OUTPATIENT
Start: 2024-02-21

## 2024-02-21 NOTE — TELEPHONE ENCOUNTER
No care due was identified.  [facility] Embedded Care Due Messages. Reference number: [id number].   2/21/2024 3:13:37 AM CST

## 2024-02-21 NOTE — TELEPHONE ENCOUNTER
Refill Decision Note   Lulú Rosenthal  is requesting a refill authorization.  Brief Assessment and Rationale for Refill:  Approve     Medication Therapy Plan:         Comments:     Note composed:6:24 AM 02/21/2024

## 2024-08-14 ENCOUNTER — TELEPHONE (OUTPATIENT)
Dept: OBSTETRICS AND GYNECOLOGY | Facility: CLINIC | Age: 64
End: 2024-08-14
Payer: COMMERCIAL

## 2024-09-03 NOTE — PROGRESS NOTES
HISTORY OF PRESENT ILLNESS:    Lulú Rosenthal is a 63 y.o. female , presents for a routine exam.  *** GYN complaints. She *** vaginal bleeding, vasomotor symptoms, vaginal dryness.  She {does/does not:95274} want STD screening.    The patient participates in regular exercise: ***.  The patient does not smoke.  The patient wears seatbelts.   Pt denies any domestic violence.  *** tattoos or blood transfusions.     SCREENING HISTORY:  PAP:  nilm - hpv   MAMMOGRAM:  , TC Score: 5.27 %   COLONOSCOPY:  (due )   Dexa: n/a     Gyn FH:  Breast cancer: none  Colon cancer: none  Ovarian cancer: none  Endometrial cancer: none      Lulú Rosenthal is a 62 y.o.  here for a gynecologic exam.    Pt has no major complaints today.    Pt reports an uneventful transition into menopause and is not on hormone replacement therapy.    Last pap ~ was benign.  Last mammo ~2022 was benign.  Pt had colonscopy 2023.  Pt is a non-smoker.  Pt denies vaginal bleeding, vaginal discharge, dyspareunia, pelvic pain, bloating, early satiety, unintentional weight loss, breast mass/skin changes, incontinence, GI or urinary complaints.    Otherwise, the pt is in her usual state of health.       Past Medical History:   Diagnosis Date    Colon polyps 2023    CTS (carpal tunnel syndrome)     Diabetes mellitus     Hypertension        Past Surgical History:   Procedure Laterality Date    BREAST BIOPSY Left     Core bx, benign    CEREBRAL ANEURYSM REPAIR      COLONOSCOPY N/A 2023    Procedure: COLONOSCOPY;  Surgeon: Brody Frias MD;  Location: [facility];  Service: Endoscopy;  Laterality: N/A;    COLONOSCOPY W/ POLYPECTOMY  2023    TUBAL LIGATION          MEDICATIONS AND ALLERGIES:      Current Outpatient Medications:     cetirizine (ZYRTEC) 10 MG tablet, Take 1 tablet (10 mg total) by mouth once daily., Disp: 30 tablet, Rfl: 3    etodolac (LODINE) 400 MG tablet, Take 1 tablet (400 mg  total) by mouth 2 (two) times daily as needed (arthritis). Arthritis knee (Patient not taking: Reported on 1/18/2024), Disp: 30 tablet, Rfl: 2    hydroCHLOROthiazide (HYDRODIURIL) 25 MG tablet, Take 1 tablet (25 mg total) by mouth once daily., Disp: 90 tablet, Rfl: 1    losartan (COZAAR) 50 MG tablet, Take 1 tablet (50 mg total) by mouth once daily., Disp: 90 tablet, Rfl: 2    potassium chloride SA (K-DUR,KLOR-CON) 20 MEQ tablet, TAKE 1 TABLET(20 MEQ) BY MOUTH TWICE DAILY, Disp: 180 tablet, Rfl: 3    tirzepatide 7.5 mg/0.5 mL PnIj, Inject 7.5 mg into the skin every 7 days., Disp: 4 Pen, Rfl: 5    tiZANidine (ZANAFLEX) 4 MG tablet, Take 1 tablet (4 mg total) by mouth 2 (two) times daily as needed (muscl spasm)., Disp: 60 tablet, Rfl: 1    zolpidem (AMBIEN) 10 mg Tab, Take 1 tablet (10 mg total) by mouth as needed (insomnia)., Disp: 30 tablet, Rfl: 1  No current facility-administered medications for this visit.    Facility-Administered Medications Ordered in Other Visits:     0.9%  NaCl infusion, , Intravenous, Continuous, Brody Frias MD    LIDOcaine (PF) 10 mg/ml (1%) injection 10 mg, 1 mL, Intradermal, Once PRN, Brody Frias MD    Review of patient's allergies indicates:   Allergen Reactions    Flonase [fluticasone] Swelling and Other (See Comments)     Nasal Swelling       Family History   Problem Relation Name Age of Onset    Heart disease Mother      Hypertension Mother      Diabetes Father      Heart disease Father      Psoriasis Neg Hx      Eczema Neg Hx         Social History     Socioeconomic History    Marital status: Single   Occupational History     Employer: [employer]   Tobacco Use    Smoking status: Some Days     Current packs/day: 0.25     Types: Cigarettes    Smokeless tobacco: Never   Substance and Sexual Activity    Alcohol use: Yes     Alcohol/week: 3.0 standard drinks of alcohol     Types: 3 Glasses of wine per week     Comment: occasionally    Drug use: No    Sexual  activity: Yes     Partners: Male       COMPREHENSIVE GYN HISTORY: ***   PAP History:  Denies abnormal Paps except a noted above.  Infection History: Denies STDs. Denies PID.  Benign History: Denies uterine fibroids. Denies ovarian cysts. Denies endometriosis. Denies other conditions.  Cancer History: Denies cervical cancer. Denies uterine cancer or hyperplasia. Denies ovarian cancer. Denies vulvar cancer or pre-cancer. Denies vaginal cancer or pre-cancer. Denies breast cancer. Denies colon cancer.    ROS:  GENERAL: No weight changes. No swelling. No fatigue. No fever.  CARDIOVASCULAR: No chest pain. No shortness of breath. No leg cramps.   NEUROLOGICAL: No headaches. No vision changes.  BREASTS: No pain. No lumps. No discharge.  ABDOMEN: No pain. No nausea. No vomiting. No diarrhea. No constipation.  REPRODUCTIVE: ***  abnormal bleeding.   VULVA: No pain. No lesions. No itching. ***  VAGINA: No relaxation. No itching. No odor. No discharge. No lesions. ***  URINARY: No incontinence. No nocturia. No frequency. No dysuria.    LMP 11/01/2016     PE:  APPEARANCE: Well nourished, well developed, in no acute distress.  AFFECT: WNL, alert and oriented x 3.  SKIN: No hirsutism or acne.  NECK: Neck symmetric without masses or thyromegaly.  NODES: No inguinal, cervical, axillary or femoral lymph node enlargement.  CHEST: Good respiratory effort.   ABDOMEN: Soft. No tenderness or masses. No hepatosplenomegaly. No hernias.  BREASTS: Symmetrical, no skin changes or visible lesions. No palpable masses, nipple discharge bilaterally.  PELVIC: ATROPHIC EXTERNAL FEMALE GENITALIA without lesions. Normal hair distribution. Adequate perineal body, normal urethral meatus. VAGINA DRY without lesions or discharge. CERVIX STENOTIC without lesions, discharge or tenderness. No significant cystocele or rectocele. Bimanual exam shows uterus to be normal size, regular, mobile and nontender. Adnexa without masses or tenderness.  EXTREMITIES: No  edema.    DIAGNOSIS:  1. Women's annual routine gynecological examination        2. Screening breast examination              PLAN:  - Pap {jmbpapchoice:88535}  - Screening tests as ordered.  - {jmbannualplan:38422}    Counseling: {danniualcounselin}    Counseling on osteoporosis prevention, calcium supplementation, and regular weight bearing exercise listed in AVS.     The patient was also counseled today on ACS PAP guidelines, with recommendations for yearly pelvic exams unless their uterus, cervix, and ovaries were removed for benign reasons; in that case, examinations every 3-5 years are recommended.  The patient was also counseled regarding monthly breast self-examination, routine STD screening for at-risk populations, prophylactic immunizations for transmitted infections such as  HPV, Pertussis, or Influenza as appropriate, and yearly mammograms when indicated by ACS guidelines.  She was advised to see her primary care physician for all other health maintenance.    FOLLOW-UP with me annually.   Maye Coy PA-C

## 2024-09-06 ENCOUNTER — OFFICE VISIT (OUTPATIENT)
Dept: OBSTETRICS AND GYNECOLOGY | Facility: CLINIC | Age: 64
End: 2024-09-06
Payer: COMMERCIAL

## 2024-09-06 VITALS
SYSTOLIC BLOOD PRESSURE: 120 MMHG | HEIGHT: 63 IN | BODY MASS INDEX: 39.46 KG/M2 | WEIGHT: 222.69 LBS | DIASTOLIC BLOOD PRESSURE: 72 MMHG

## 2024-09-06 DIAGNOSIS — F17.200 NEEDS SMOKING CESSATION EDUCATION: ICD-10-CM

## 2024-09-06 DIAGNOSIS — Z12.39 SCREENING BREAST EXAMINATION: ICD-10-CM

## 2024-09-06 DIAGNOSIS — Z01.419 WOMEN'S ANNUAL ROUTINE GYNECOLOGICAL EXAMINATION: Primary | ICD-10-CM

## 2024-09-06 PROCEDURE — 99999 PR PBB SHADOW E&M-EST. PATIENT-LVL III: CPT | Mod: PBBFAC,,, | Performed by: OBSTETRICS & GYNECOLOGY

## 2024-09-06 NOTE — LETTER
September 6, 2024    Lulú Rosenthal  [address] [address]             [facility]  Obstetrics and Gynecology  [address]  [place] [address]  Phone: [phone]   September 6, 2024     Patient: Lulú Rosenthal   YOB: 1960   Date of Visit: 9/6/2024       To Whom it May Concern:    Lulú Rosenthal was seen in my clinic on 9/6/2024. She may return with no restrictions on Monday 09/09/2024.    Please excuse her from any classes or work missed.    If you have any questions or concerns, please don't hesitate to call.    Sincerely,           Frederick Guerrero MD

## 2024-09-06 NOTE — PROGRESS NOTES
Subjective:       Patient ID: Lulú Rosenthal is a 63 y.o. female.    Chief Complaint:  Well Woman (Pt is here for annual. Last normal pap with Negative HPV was 2023 and last normal mammogram was 2023. )        History of Present Illness  HPI  Annual Exam-Postmenopausal  Patient presents for annual exam. The patient has no complaints today. The patient is not currently sexually active. GYN screening history:  Last normal pap with Negative HPV was 2023 and last normal mammogram was 2023.  Last colonoscopy with polyps on 2023.  Repeat in  . The patient is not taking hormone replacement therapy. Patient denies post-menopausal vaginal bleeding. The patient wears seatbelts: yes. The patient participates in regular exercise: yes. Has the patient ever been transfused or tattooed?: not asked. The patient reports that there is not domestic violence in her life.    CHTN on medications  Status post tubal ligation      GYN & OB History  Patient's last menstrual period was 2016.   Date of Last Pap: 2023    OB History    Para Term  AB Living   3 3 3 0 0 3   SAB IAB Ectopic Multiple Live Births   0 0 0 0        # Outcome Date GA Lbr Delvis/2nd Weight Sex Type Anes PTL Lv   3 Term            2 Term            1 Term               Obstetric Comments    x 3       Past Medical History:   Diagnosis Date    Colon polyps 2023    CTS (carpal tunnel syndrome)     Hypertension        Past Surgical History:   Procedure Laterality Date    BREAST BIOPSY Left 2013    Core bx, benign    CEREBRAL ANEURYSM REPAIR      COLONOSCOPY N/A 2023    Procedure: COLONOSCOPY;  Surgeon: Brody Frias MD;  Location: [facility];  Service: Endoscopy;  Laterality: N/A;    COLONOSCOPY W/ POLYPECTOMY  2023    TUBAL LIGATION         Family History   Problem Relation Name Age of Onset    Heart disease Mother      Hypertension Mother      Diabetes Father      Heart disease Father       Psoriasis Neg Hx      Eczema Neg Hx         Social History     Socioeconomic History    Marital status: Single   Occupational History     Employer: [employer]   Tobacco Use    Smoking status: Some Days     Current packs/day: 0.25     Types: Cigarettes    Smokeless tobacco: Never   Substance and Sexual Activity    Alcohol use: Yes     Alcohol/week: 3.0 standard drinks of alcohol     Types: 3 Glasses of wine per week     Comment: occasionally    Drug use: No    Sexual activity: Yes     Partners: Male   Social History Narrative    Lives alone with her dog    No longer with her partner    She is working for [employer]     Social Determinants of Health     Financial Resource Strain: Low Risk  (9/6/2024)    Overall Financial Resource Strain (CARDIA)     Difficulty of Paying Living Expenses: Not hard at all   Food Insecurity: No Food Insecurity (9/6/2024)    Hunger Vital Sign     Worried About Running Out of Food in the Last Year: Never true     Ran Out of Food in the Last Year: Never true   Physical Activity: Insufficiently Active (9/6/2024)    Exercise Vital Sign     Days of Exercise per Week: 1 day     Minutes of Exercise per Session: 10 min   Stress: No Stress Concern Present (9/6/2024)    [employer] [place] [employer] - Occupational Stress Questionnaire     Feeling of Stress : Not at all   Housing Stability: Unknown (9/6/2024)    Housing Stability Vital Sign     Unable to Pay for Housing in the Last Year: No       Current Outpatient Medications   Medication Sig Dispense Refill    cetirizine (ZYRTEC) 10 MG tablet Take 1 tablet (10 mg total) by mouth once daily. 30 tablet 3    hydroCHLOROthiazide (HYDRODIURIL) 25 MG tablet Take 1 tablet (25 mg total) by mouth once daily. 90 tablet 1    losartan (COZAAR) 50 MG tablet Take 1 tablet (50 mg total) by mouth once daily. 90 tablet 2    potassium chloride SA (K-DUR,KLOR-CON) 20 MEQ tablet TAKE 1 TABLET(20 MEQ) BY MOUTH TWICE DAILY 180  tablet 3    tiZANidine (ZANAFLEX) 4 MG tablet Take 1 tablet (4 mg total) by mouth 2 (two) times daily as needed (muscl spasm). 60 tablet 1    zolpidem (AMBIEN) 10 mg Tab Take 1 tablet (10 mg total) by mouth as needed (insomnia). 30 tablet 1     No current facility-administered medications for this visit.     Facility-Administered Medications Ordered in Other Visits   Medication Dose Route Frequency Provider Last Rate Last Admin    0.9%  NaCl infusion   Intravenous Continuous Brody Frias MD        LIDOcaine (PF) 10 mg/ml (1%) injection 10 mg  1 mL Intradermal Once PRN Brody Frias MD           Review of patient's allergies indicates:   Allergen Reactions    Flonase [fluticasone] Swelling and Other (See Comments)     Nasal Swelling        Review of Systems  Review of Systems   Constitutional:  Negative for activity change, appetite change, chills, fatigue, fever and unexpected weight change.   HENT:  Negative for mouth sores.    Respiratory:  Negative for cough, shortness of breath and wheezing.    Cardiovascular:  Negative for chest pain and palpitations.   Gastrointestinal:  Negative for abdominal pain, bloating, blood in stool, constipation, nausea and vomiting.   Endocrine: Negative for diabetes and hot flashes.   Genitourinary:  Negative for dysmenorrhea, dyspareunia, dysuria, frequency, hematuria, menorrhagia, menstrual problem, pelvic pain, urgency, vaginal bleeding, vaginal discharge, vaginal pain, urinary incontinence, postcoital bleeding and vaginal odor.   Musculoskeletal:  Negative for back pain and myalgias.   Integumentary:  Negative for rash, breast mass and nipple discharge.   Neurological:  Negative for seizures and headaches.   Psychiatric/Behavioral:  Negative for depression and sleep disturbance. The patient is not nervous/anxious.    Breast: Negative for mass, mastodynia and nipple discharge          Objective:    Physical Exam:   Constitutional: She appears well-developed and  well-nourished. No distress.   BMI of 39.44    HENT:   Head: Normocephalic and atraumatic.    Eyes: EOM are normal.      Pulmonary/Chest: Effort normal. No respiratory distress.   Breasts: Non-tender, no engorgement, no masses, no retraction, no discharge. Negative for lymphadenopathy.         Abdominal: Soft. She exhibits no distension. There is no abdominal tenderness. There is no rebound and no guarding.     Genitourinary:    Vagina and uterus normal.   No vaginal discharge in the vagina.    Genitourinary Comments: Vulva without any obvious lesions.  Urethral meatus normal size and location without any lesion.  Urethra is non-tender without stricture or discharge.  Bladder is non-tender.  Vaginal vault with good support.  Minimal white discharge noted.  No obvious lesion.  Normal rugation.  Grade 2 cystocele and rectocele.  Cervix is without any cervical motion tenderness.  No obvious lesion.  Uterus is small, non-tender, normal contour.  Adnexa is without any masses or tenderness.  Perineum without obvious lesion.               Musculoskeletal: Normal range of motion.       Neurological: She is alert.    Skin: Skin is warm and dry.    Psychiatric: She has a normal mood and affect.          Assessment:        1. Women's annual routine gynecological examination    2. Screening breast examination              Plan:          I have discussed with the patient her condition.  Monthly breast examination was instructed, discussed, and encouraged.  Patient was encouraged to consume a low-calorie, low fat diet, and to increase of physical activity.  Healthy habits encouraged.  A Pap smear was not performed according to the USPSTF recommendations.  Mammogram was not ordered because it was ordered and scheduled by her PCP.  Gonorrhea and Chlamydia testing not performed;  HIV test not offered, again according to guidelines.  Colonoscopy discussed according to ACS guideline.  We also discussed her obstetric history and CV  risks.   Care Gaps addressed.  She should get her   Patient is to continue her medications as prescribed.  She will come back to see me in one year for her annual visit.  She can come back to see me sooner as necessary.  All of her questions were answered appropriately to her satisfaction.          ** A female chaperone, Maria Dolores Morton, was present for the pelvic exam

## 2024-09-13 ENCOUNTER — PATIENT MESSAGE (OUTPATIENT)
Dept: OBSTETRICS AND GYNECOLOGY | Facility: CLINIC | Age: 64
End: 2024-09-13
Payer: COMMERCIAL

## 2024-09-17 DIAGNOSIS — Z11.3 SCREENING FOR STDS (SEXUALLY TRANSMITTED DISEASES): Primary | ICD-10-CM

## 2024-09-21 ENCOUNTER — IMMUNIZATION (OUTPATIENT)
Dept: OBSTETRICS AND GYNECOLOGY | Facility: CLINIC | Age: 64
End: 2024-09-21
Payer: COMMERCIAL

## 2024-09-21 ENCOUNTER — LAB VISIT (OUTPATIENT)
Dept: LAB | Facility: HOSPITAL | Age: 64
End: 2024-09-21
Attending: OBSTETRICS & GYNECOLOGY
Payer: COMMERCIAL

## 2024-09-21 DIAGNOSIS — Z11.3 SCREENING FOR STDS (SEXUALLY TRANSMITTED DISEASES): ICD-10-CM

## 2024-09-21 DIAGNOSIS — Z23 NEED FOR VACCINATION: Primary | ICD-10-CM

## 2024-09-21 PROCEDURE — 87389 HIV-1 AG W/HIV-1&-2 AB AG IA: CPT | Performed by: OBSTETRICS & GYNECOLOGY

## 2024-09-21 PROCEDURE — 90656 IIV3 VACC NO PRSV 0.5 ML IM: CPT | Mod: S$GLB,,, | Performed by: FAMILY MEDICINE

## 2024-09-21 PROCEDURE — 87491 CHLMYD TRACH DNA AMP PROBE: CPT | Performed by: OBSTETRICS & GYNECOLOGY

## 2024-09-21 PROCEDURE — 87591 N.GONORRHOEAE DNA AMP PROB: CPT | Performed by: OBSTETRICS & GYNECOLOGY

## 2024-09-21 PROCEDURE — 36415 COLL VENOUS BLD VENIPUNCTURE: CPT | Performed by: OBSTETRICS & GYNECOLOGY

## 2024-09-21 PROCEDURE — 90471 IMMUNIZATION ADMIN: CPT | Mod: S$GLB,,, | Performed by: FAMILY MEDICINE

## 2024-09-22 LAB — HIV 1+2 AB+HIV1 P24 AG SERPL QL IA: NORMAL

## 2024-09-24 LAB
C TRACH DNA SPEC QL NAA+PROBE: NOT DETECTED
N GONORRHOEA DNA SPEC QL NAA+PROBE: NOT DETECTED

## 2024-10-13 ENCOUNTER — TELEPHONE (OUTPATIENT)
Dept: PRIMARY CARE CLINIC | Facility: CLINIC | Age: 64
End: 2024-10-13
Payer: COMMERCIAL

## 2024-10-13 DIAGNOSIS — R73.03 PREDIABETES: ICD-10-CM

## 2024-10-13 DIAGNOSIS — I10 ESSENTIAL HYPERTENSION: Primary | ICD-10-CM

## 2024-10-13 DIAGNOSIS — M17.12 ARTHRITIS OF LEFT KNEE: ICD-10-CM

## 2024-10-16 ENCOUNTER — TELEPHONE (OUTPATIENT)
Dept: PRIMARY CARE CLINIC | Facility: CLINIC | Age: 64
End: 2024-10-16
Payer: COMMERCIAL

## 2024-10-29 ENCOUNTER — TELEPHONE (OUTPATIENT)
Dept: OBSTETRICS AND GYNECOLOGY | Facility: CLINIC | Age: 64
End: 2024-10-29
Payer: COMMERCIAL

## 2024-10-31 ENCOUNTER — HOSPITAL ENCOUNTER (OUTPATIENT)
Dept: RADIOLOGY | Facility: HOSPITAL | Age: 64
Discharge: HOME | End: 2024-10-31
Attending: OBSTETRICS & GYNECOLOGY
Payer: COMMERCIAL

## 2024-10-31 ENCOUNTER — OFFICE VISIT (OUTPATIENT)
Dept: OBSTETRICS AND GYNECOLOGY | Facility: CLINIC | Age: 64
End: 2024-10-31
Payer: COMMERCIAL

## 2024-10-31 VITALS — HEART RATE: 106 BPM | DIASTOLIC BLOOD PRESSURE: 82 MMHG | SYSTOLIC BLOOD PRESSURE: 145 MMHG

## 2024-10-31 DIAGNOSIS — N95.0 POSTMENOPAUSAL BLEEDING: ICD-10-CM

## 2024-10-31 DIAGNOSIS — N95.0 POSTMENOPAUSAL BLEEDING: Primary | ICD-10-CM

## 2024-10-31 PROCEDURE — 4010F ACE/ARB THERAPY RXD/TAKEN: CPT | Mod: CPTII,S$GLB,, | Performed by: OBSTETRICS & GYNECOLOGY

## 2024-10-31 PROCEDURE — 3079F DIAST BP 80-89 MM HG: CPT | Mod: CPTII,S$GLB,, | Performed by: OBSTETRICS & GYNECOLOGY

## 2024-10-31 PROCEDURE — 76830 TRANSVAGINAL US NON-OB: CPT | Mod: 26,,, | Performed by: RADIOLOGY

## 2024-10-31 PROCEDURE — 1159F MED LIST DOCD IN RCRD: CPT | Mod: CPTII,S$GLB,, | Performed by: OBSTETRICS & GYNECOLOGY

## 2024-10-31 PROCEDURE — 3044F HG A1C LEVEL LT 7.0%: CPT | Mod: CPTII,S$GLB,, | Performed by: OBSTETRICS & GYNECOLOGY

## 2024-10-31 PROCEDURE — 99999 PR PBB SHADOW E&M-EST. PATIENT-LVL III: CPT | Mod: PBBFAC,,, | Performed by: OBSTETRICS & GYNECOLOGY

## 2024-10-31 PROCEDURE — 3077F SYST BP >= 140 MM HG: CPT | Mod: CPTII,S$GLB,, | Performed by: OBSTETRICS & GYNECOLOGY

## 2024-10-31 PROCEDURE — 1160F RVW MEDS BY RX/DR IN RCRD: CPT | Mod: CPTII,S$GLB,, | Performed by: OBSTETRICS & GYNECOLOGY

## 2024-10-31 PROCEDURE — 76856 US EXAM PELVIC COMPLETE: CPT | Mod: TC

## 2024-10-31 PROCEDURE — 99213 OFFICE O/P EST LOW 20 MIN: CPT | Mod: S$GLB,,, | Performed by: OBSTETRICS & GYNECOLOGY

## 2024-10-31 PROCEDURE — 76856 US EXAM PELVIC COMPLETE: CPT | Mod: 26,,, | Performed by: RADIOLOGY

## 2024-10-31 PROCEDURE — 76830 TRANSVAGINAL US NON-OB: CPT | Mod: TC

## 2024-11-16 ENCOUNTER — HOSPITAL ENCOUNTER (OUTPATIENT)
Dept: RADIOLOGY | Facility: HOSPITAL | Age: 64
Discharge: HOME | End: 2024-11-16
Attending: INTERNAL MEDICINE
Payer: COMMERCIAL

## 2024-11-16 DIAGNOSIS — Z12.31 ENCOUNTER FOR SCREENING MAMMOGRAM FOR BREAST CANCER: ICD-10-CM

## 2024-11-16 PROCEDURE — 77067 SCR MAMMO BI INCL CAD: CPT | Mod: 26,,, | Performed by: RADIOLOGY

## 2024-11-16 PROCEDURE — 77063 BREAST TOMOSYNTHESIS BI: CPT | Mod: 26,,, | Performed by: RADIOLOGY

## 2024-11-16 PROCEDURE — 77067 SCR MAMMO BI INCL CAD: CPT | Mod: TC

## 2024-11-22 DIAGNOSIS — T78.40XA ALLERGY, INITIAL ENCOUNTER: ICD-10-CM

## 2024-11-22 DIAGNOSIS — G47.00 INSOMNIA, UNSPECIFIED TYPE: ICD-10-CM

## 2024-11-22 DIAGNOSIS — I10 ESSENTIAL HYPERTENSION: ICD-10-CM

## 2024-11-22 RX ORDER — CETIRIZINE HYDROCHLORIDE 10 MG/1
10 TABLET ORAL DAILY
Qty: 90 TABLET | Refills: 0 | Status: SHIPPED | OUTPATIENT
Start: 2024-11-22

## 2024-11-22 NOTE — TELEPHONE ENCOUNTER
No care due was identified.  [facility] Embedded Care Due Messages. Reference number: [id number].   11/22/2024 12:23:19 PM CST

## 2024-11-22 NOTE — TELEPHONE ENCOUNTER
Care Due:                  Date            Visit Type   Department     Provider  --------------------------------------------------------------------------------                                MYCHART                              FOLLOWUP/OF  [facility]  Last Visit: 01-      FICE VISIT   PRIMARY CARE   Ran Asencio                               -                              [facility]  Next Visit: 12-      CARE ([facility])   PRIMARY CARE   Ran Asencio                                                            Last  Test          Frequency    Reason                     Performed    Due Date  --------------------------------------------------------------------------------    CMP.........  12 months..  hydroCHLOROthiazide,       03-   01-                             losartan, potassium......    [employer] Embedded Care Due Messages. Reference number: [id number].   11/22/2024 12:22:12 PM CST

## 2024-11-22 NOTE — TELEPHONE ENCOUNTER
Refill Routing Note   Medication(s) are not appropriate for processing by [facility] for the following reason(s):        Required vitals abnormal    [facility] action(s):  Approve  Defer        Medication Therapy Plan: 10/31/24 (!) 145/82      Appointments  past 12m or future 3m with PCP    Date Provider   Last Visit   1/18/2024 Ran Asencio MD   Next Visit   12/31/2024 Ran Asencio MD   ED visits in past 90 days: 0        Note composed:3:56 PM 11/22/2024

## 2024-11-24 RX ORDER — ZOLPIDEM TARTRATE 5 MG/1
5 TABLET ORAL
Qty: 30 TABLET | Refills: 0 | Status: SHIPPED | OUTPATIENT
Start: 2024-11-24

## 2024-11-24 RX ORDER — HYDROCHLOROTHIAZIDE 25 MG/1
25 TABLET ORAL DAILY
Qty: 90 TABLET | Refills: 1 | Status: SHIPPED | OUTPATIENT
Start: 2024-11-24

## 2025-02-02 ENCOUNTER — OFFICE VISIT (OUTPATIENT)
Dept: URGENT CARE | Facility: CLINIC | Age: 65
End: 2025-02-02
Payer: COMMERCIAL

## 2025-02-02 VITALS
BODY MASS INDEX: 38.98 KG/M2 | RESPIRATION RATE: 18 BRPM | WEIGHT: 220 LBS | OXYGEN SATURATION: 97 % | SYSTOLIC BLOOD PRESSURE: 142 MMHG | DIASTOLIC BLOOD PRESSURE: 67 MMHG | HEIGHT: 63 IN | HEART RATE: 93 BPM | TEMPERATURE: 99 F

## 2025-02-02 DIAGNOSIS — R05.1 ACUTE COUGH: ICD-10-CM

## 2025-02-02 DIAGNOSIS — J30.9 ALLERGIC RHINITIS, UNSPECIFIED SEASONALITY, UNSPECIFIED TRIGGER: ICD-10-CM

## 2025-02-02 DIAGNOSIS — M54.9 MID BACK PAIN ON RIGHT SIDE: Primary | ICD-10-CM

## 2025-02-02 PROCEDURE — 96372 THER/PROPH/DIAG INJ SC/IM: CPT | Mod: S$GLB,,,

## 2025-02-02 PROCEDURE — 99203 OFFICE O/P NEW LOW 30 MIN: CPT | Mod: 25,S$GLB,,

## 2025-02-02 RX ORDER — CYCLOBENZAPRINE HCL 10 MG
10 TABLET ORAL 2 TIMES DAILY PRN
Qty: 20 TABLET | Refills: 0 | Status: SHIPPED | OUTPATIENT
Start: 2025-02-02 | End: 2025-02-02

## 2025-02-02 RX ORDER — BENZONATATE 100 MG/1
100 CAPSULE ORAL 3 TIMES DAILY PRN
Qty: 30 CAPSULE | Refills: 0 | Status: SHIPPED | OUTPATIENT
Start: 2025-02-02 | End: 2025-02-12

## 2025-02-02 RX ORDER — CYCLOBENZAPRINE HCL 10 MG
10 TABLET ORAL NIGHTLY
Qty: 10 TABLET | Refills: 0 | Status: SHIPPED | OUTPATIENT
Start: 2025-02-02 | End: 2025-02-12

## 2025-02-02 RX ORDER — DEXAMETHASONE SODIUM PHOSPHATE 10 MG/ML
8 INJECTION INTRAMUSCULAR; INTRAVENOUS
Status: COMPLETED | OUTPATIENT
Start: 2025-02-02 | End: 2025-02-02

## 2025-02-02 RX ADMIN — DEXAMETHASONE SODIUM PHOSPHATE 8 MG: 10 INJECTION INTRAMUSCULAR; INTRAVENOUS at 11:02

## 2025-02-02 NOTE — PROGRESS NOTES
"Subjective:      Patient ID: Lulú Rosenthal is a 64 y.o. female.    Vitals:  height is 5' 3" (1.6 m) and weight is 99.8 kg (220 lb). Her oral temperature is 98.8 °F (37.1 °C). Her blood pressure is 142/67 (abnormal) and her pulse is 93. Her respiration is 18 and oxygen saturation is 97%.     Chief Complaint: Back Pain    Back pain x yesterday. Pain on right side. OTC ibuprofen without relief.   Chronic lower back pain, gets injections for back. Last injection June 2024    Sinus pressure, PND, watery eyes, cough x 2 weeks. No fevers. Symptoms have not worsened. She reports chronic sinus issues.       Back Pain  This is a chronic problem. The current episode started yesterday. The problem occurs constantly. The problem has been gradually worsening since onset. The quality of the pain is described as aching. The pain does not radiate. The pain is at a severity of 9/10. The pain is moderate. The pain is The same all the time. The symptoms are aggravated by twisting and standing. Associated symptoms include headaches. Pertinent negatives include no abdominal pain, bladder incontinence, bowel incontinence, chest pain, dysuria, fever, leg pain, numbness, paresis, paresthesias, pelvic pain, perianal numbness, tingling, weakness or weight loss. She has tried NSAIDs for the symptoms. The treatment provided no relief.       Constitution: Negative for chills, fatigue and fever.   HENT:  Positive for postnasal drip and sinus pressure.    Cardiovascular:  Negative for chest pain.   Respiratory:  Positive for cough. Negative for shortness of breath and wheezing.    Gastrointestinal:  Negative for abdominal pain, nausea, vomiting, diarrhea and bowel incontinence.   Genitourinary:  Negative for dysuria, bladder incontinence and pelvic pain.   Musculoskeletal:  Positive for back pain and history of spine disorder.   Neurological:  Positive for headaches. Negative for numbness.      Objective:     Physical Exam   Constitutional: " She is oriented to person, place, and time. She appears well-developed. She is cooperative. No distress.   HENT:   Head: Normocephalic and atraumatic.   Ears:   Right Ear: Tympanic membrane, external ear and ear canal normal.   Left Ear: Tympanic membrane, external ear and ear canal normal.   Nose: Nose normal. No rhinorrhea or congestion.   Mouth/Throat: Oropharynx is clear and moist and mucous membranes are normal. No oropharyngeal exudate or posterior oropharyngeal erythema.   Eyes: Conjunctivae and lids are normal.   Neck: Trachea normal and phonation normal. Neck supple.   Cardiovascular: Normal rate, regular rhythm, normal heart sounds and normal pulses.   Pulmonary/Chest: Effort normal and breath sounds normal. No stridor. No respiratory distress. She has no wheezes. She has no rhonchi. She has no rales.   Abdominal: Normal appearance and bowel sounds are normal. She exhibits no mass. Soft.   Musculoskeletal:         General: No deformity.      Thoracic back: She exhibits tenderness. She exhibits no bony tenderness.      Lumbar back: She exhibits tenderness. She exhibits no bony tenderness.   Neurological: She is alert and oriented to person, place, and time. She has normal strength and normal reflexes. No sensory deficit.   Skin: Skin is warm, dry, intact and not diaphoretic.   Psychiatric: Her speech is normal and behavior is normal. Judgment and thought content normal.   Nursing note and vitals reviewed.      Assessment:     1. Mid back pain on right side    2. Acute cough    3. Allergic rhinitis, unspecified seasonality, unspecified trigger        Plan:       Mid back pain on right side  -     dexAMETHasone injection 8 mg  -     Discontinue: cyclobenzaprine (FLEXERIL) 10 MG tablet; Take 1 tablet (10 mg total) by mouth 2 (two) times daily as needed for Muscle spasms.  Dispense: 20 tablet; Refill: 0  -     cyclobenzaprine (FLEXERIL) 10 MG tablet; Take 1 tablet (10 mg total) by mouth every evening. for 10  days  Dispense: 10 tablet; Refill: 0    Acute cough  -     benzonatate (TESSALON) 100 MG capsule; Take 1 capsule (100 mg total) by mouth 3 (three) times daily as needed for Cough.  Dispense: 30 capsule; Refill: 0    Allergic rhinitis, unspecified seasonality, unspecified trigger            Discussed results/diagnosis/plan with patient in clinic. Strict precautions given to patient to monitor for worsening signs and symptoms. Advised to follow up with PCP or specialist.  Explained side effects of medications prescribed with patient and informed him/her to discontinue use if he/she has any side effects and to inform UC or PCP if this occurs. All questions answered. Strict ED verses clinic return precautions stressed and given in depth. Advised if symptoms worsens of fail to improve he/she should go to the Emergency Room. Discharge and follow-up instructions given verbally/printed with the patient who expressed understanding and willingness to comply with my recommendations. Patient voiced understanding and in agreement with current treatment plan. Patient exits the exam room in no acute distress. Conversant and engaged during discharge discussion, verbalized understanding.

## 2025-02-02 NOTE — PATIENT INSTRUCTIONS
Alternate tylenol and ibuprofen every 4 hours as needed for pain. Always take ibuprofen with food and water to avoid GI upset. Stop taking if you develop abdominal pain or blood in stool.     Take flexeril (muscle relaxer) before bedtime. This will make you drowsy. Make sure not to drive, drink alcohol, operate machinery or take other sedating medications while taking this.      Place an over the counter lidocaine patch to the most painful area x 12 hours. This will help numb the area and help with pain.    Try ice/heat pack intermittently. Ice will help decrease inflammation. Heat will help relax tight/tense muscles. Practice good body mechanics and have good posture. Have good back support while sitting. Avoid activities that will worsen pain. Try to not sit, stand or lie down for too long. Sleep on your back with a pillow supporting your neck, lower back and knees for the best spinal alignment. This will promote healing as you sleep. Wear good supportive shoes. Complete back exercises when feeling better and work on good core strength. I have included examples on your discharge paperwork for you to review. Avoid foods that will cause inflammation like processed, fried foods, fast foods and gluten (white bread, white rice, white pasta), red meat and pork. Drink plenty of fluids as this will help with musculoskeletal pain and will help with circulation. Try yoga and walking for strength and flexibility. This will help prevent injuries in the future.     Follow up with PCP for ongoing symptoms and not better by medications in 1 week.     Go to the ER if you experience numbness/tingling to groin or extremities, if you have loss of bowel/urine, progressive weakness to extremities.              cough  Try tessalon perles as directed during the day for your cough. It will help numb the back of your throat so you do not have the urge to cough.      Try a decongestant and corticosteroid nasal spray like flonase for the  next few days for sinus relief. Initial: 2 sprays in each nostril once daily for 1 week. Reduce to 1 spray in each nostril once per day. Stop taking if you develop a nose bleed. Nasal saline spray can be used together with flonase to help moisten nostrils. An antihistamine like zyrtec, claritin, allegra can also be helpful for sinus relief and will help dry nasal passages.     Honey is a natural cough suppressant.     If you do have Hypertension or palpitations, it is safe to take Coricidin HBP (multi-symptom flu) for relief of sinus symptoms.  Try DASH diet to help lower BP and buy a blood pressure cuff for home monitoring. Check blood pressure at least 2 times per day and create a log. Avoid eating foods that are high in salt. Eat more foods with potassium, magnesium and calcium which will help dilate your vessels and decrease your BP.     Please only use over the counter cough and cold medications (decongestants) for 3-5 days at a time to avoid rebound congestion.     Warm tea/warm liquids will help soothe the back of your throat. Warm water salt gurgles can also be helpful. A dry throat will cause pain. Make sure to stay hydrated. Water and pedilyte are the best to drink. Neti pot irrigation, humidifier in your room, avoiding fans, warm compresses to face, eating/drinking hot soups, hot shower before bedtime can help.     The recommended daily fluid intake for women is 2.7 liters (five 16 oz bottles).      Alternate Tylenol and ibuprofen every 4 hours as needed for fever and body aches.  Please take NSAIDs with a full glass of water and food to avoid GI upset.      Getting plenty of rest can aid in a faster recovery of illnesses.     Please follow-up with your primary care provider or return to the clinic if not better/worsening symptoms in 1 week.     Report to the ER if you have chest pain, shortness of breath, palpitations.

## 2025-02-12 ENCOUNTER — OFFICE VISIT (OUTPATIENT)
Dept: DERMATOLOGY | Facility: CLINIC | Age: 65
End: 2025-02-12
Payer: COMMERCIAL

## 2025-02-12 DIAGNOSIS — L82.1 DERMATOSIS PAPULOSA NIGRA: ICD-10-CM

## 2025-02-12 DIAGNOSIS — L85.3 XEROSIS CUTIS: Primary | ICD-10-CM

## 2025-02-12 DIAGNOSIS — D23.9 DILATED PORE OF WINER: ICD-10-CM

## 2025-02-12 DIAGNOSIS — L29.9 PRURITUS: ICD-10-CM

## 2025-02-12 PROCEDURE — 99203 OFFICE O/P NEW LOW 30 MIN: CPT | Mod: S$GLB,,, | Performed by: STUDENT IN AN ORGANIZED HEALTH CARE EDUCATION/TRAINING PROGRAM

## 2025-02-12 PROCEDURE — 1159F MED LIST DOCD IN RCRD: CPT | Mod: CPTII,S$GLB,, | Performed by: STUDENT IN AN ORGANIZED HEALTH CARE EDUCATION/TRAINING PROGRAM

## 2025-02-12 PROCEDURE — 1160F RVW MEDS BY RX/DR IN RCRD: CPT | Mod: CPTII,S$GLB,, | Performed by: STUDENT IN AN ORGANIZED HEALTH CARE EDUCATION/TRAINING PROGRAM

## 2025-02-12 PROCEDURE — 99999 PR PBB SHADOW E&M-EST. PATIENT-LVL III: CPT | Mod: PBBFAC,,, | Performed by: STUDENT IN AN ORGANIZED HEALTH CARE EDUCATION/TRAINING PROGRAM

## 2025-02-12 RX ORDER — TRIAMCINOLONE ACETONIDE 1 MG/G
CREAM TOPICAL
Qty: 30 G | Refills: 2 | Status: SHIPPED | OUTPATIENT
Start: 2025-02-12

## 2025-02-12 NOTE — PATIENT INSTRUCTIONS
Recommend bathing in lukewarm water (not scalding hot) no more than 5-10 minutes per day. Recommend fragrance-free detergent (I.e. All Free and Clear) and soap (I.e. Dove sensitive skin bar soap) only to soiled areas of body. Recommend liberal use of moisturizing cream (I.e. CeraVe cream, Cetaphil cream) especially within 5 minutes of bathing.    Start pramoxine lotion (this is an anti-itch lotion that be purchased over the counter at [employer], brands include Sarna or CeraVe anti-itch) every day as often needed to relieve itching symptoms instead of scratching, can keep bottle of lotion in refrigerator and it may help relieve itch more    Start triamcinolone cream twice daily to affected area of itching/rash maximum 2 weeks per month

## 2025-02-12 NOTE — PROGRESS NOTES
Subjective:      Patient ID:  Lulú Rosenthal is a 64 y.o. female who presents for No chief complaint on file.    64 y.o. female presents today for itching.    Last office visit 8/14/2013 with Dr. Emily Duran. Itching  Location: right side of lower back   Duration:9 months ago; comes and goes but when flared itches 10/10, right now is 5/10  Symptoms: itches   Current treatments: A&D ointment- does not help   Prior treatments tried: alcohol, heating pads to area for relief  Other pertinent history:  Gain detergent, no dryer sheet, soap is Dove or oatmeal bar  Sprays perfumes in front    Patient with new area of concern:   Location: left cheek   Duration: years   Previous treatments: none       Review of Systems    Objective:   Physical Exam   Skin:   Areas Examined (abnormalities noted in diagram):   Head / Face Inspection Performed  Neck Inspection Performed            Diagram Legend     Erythematous scaling macule/papule c/w actinic keratosis       Vascular papule c/w angioma      Pigmented verrucoid papule/plaque c/w seborrheic keratosis      Yellow umbilicated papule c/w sebaceous hyperplasia      Irregularly shaped tan macule c/w lentigo     1-2 mm smooth white papules consistent with Milia      Movable subcutaneous cyst with punctum c/w epidermal inclusion cyst      Subcutaneous movable cyst c/w pilar cyst      Firm pink to brown papule c/w dermatofibroma      Pedunculated fleshy papule(s) c/w skin tag(s)      Evenly pigmented macule c/w junctional nevus     Mildly variegated pigmented, slightly irregular-bordered macule c/w mildly atypical nevus      Flesh colored to evenly pigmented papule c/w intradermal nevus       Pink pearly papule/plaque c/w basal cell carcinoma      Erythematous hyperkeratotic cursted plaque c/w SCC      Surgical scar with no sign of skin cancer recurrence      Open and closed comedones      Inflammatory papules and pustules      Verrucoid papule consistent consistent with wart      Erythematous eczematous patches and plaques     Dystrophic onycholytic nail with subungual debris c/w onychomycosis     Umbilicated papule    Erythematous-base heme-crusted tan verrucoid plaque consistent with inflamed seborrheic keratosis     Erythematous Silvery Scaling Plaque c/w Psoriasis     See annotation      Assessment / Plan:        Xerosis cutis  Pruritus  Lower back with mild xerosis and evidence of pruritus, no primary dermatiits    Recommend bathing in lukewarm water (not scalding hot) no more than 5-10 minutes per day. Recommend fragrance-free detergent (I.e. All Free and Clear) and soap (I.e. Dove sensitive skin bar soap) only to soiled areas of body. Recommend liberal use of moisturizing cream (I.e. CeraVe cream, Cetaphil cream) especially within 5 minutes of bathing.    Start pramoxine lotion (this is an anti-itch lotion that be purchased over the counter at [employer], brands include Sarna or CeraVe anti-itch) every day as often needed to relieve itching symptoms instead of scratching, can keep bottle of lotion in refrigerator and it may help relieve itch more    Start triamcinolone cream twice daily to affected area of itching/rash maximum 2 weeks per month    If not improved with topical treatments, return to clinic. Discussed localized pruritus may be related to dry skin, skin irritants or allergens, or neuropathic related itching    Dermatosis papulosa nigra  Reassurance given to patient. No treatment is necessary.   Treatment of benign, asymptomatic lesions may be considered cosmetic.    Dilated pore of [place]  R cheek- reassurance  Comedo extractor performed today with successful extraction of open comedone    RTC prn if condition worsens or fails to improve

## 2025-03-06 ENCOUNTER — TELEPHONE (OUTPATIENT)
Dept: PRIMARY CARE CLINIC | Facility: CLINIC | Age: 65
End: 2025-03-06
Payer: COMMERCIAL

## 2025-03-06 NOTE — TELEPHONE ENCOUNTER
----- Message from Pauline sent at 3/6/2025 10:19 AM CST -----  Contact: Self/ [phone]  type: LabCaller is requesting to schedule their Lab appointment prior to an appointment.Order is not listed in EPIC.  Please enter order and contact patient to schedule.Preferred Date and Time of Labs 3/8 , 8:00 am Date of Appointment:3/13 Where would they [facility] the lab performed[facility] [place] Would the patient rather a call back or a response via [facility]? Call back Best Call Back Number:[phone]

## 2025-03-10 ENCOUNTER — RESULTS FOLLOW-UP (OUTPATIENT)
Dept: PRIMARY CARE CLINIC | Facility: CLINIC | Age: 65
End: 2025-03-10

## 2025-03-13 ENCOUNTER — OFFICE VISIT (OUTPATIENT)
Dept: PRIMARY CARE CLINIC | Facility: CLINIC | Age: 65
End: 2025-03-13
Payer: COMMERCIAL

## 2025-03-13 VITALS
DIASTOLIC BLOOD PRESSURE: 88 MMHG | HEIGHT: 63 IN | RESPIRATION RATE: 16 BRPM | SYSTOLIC BLOOD PRESSURE: 134 MMHG | OXYGEN SATURATION: 96 % | BODY MASS INDEX: 40.08 KG/M2 | HEART RATE: 90 BPM | WEIGHT: 226.19 LBS

## 2025-03-13 DIAGNOSIS — B36.0 TINEA VERSICOLOR: Primary | ICD-10-CM

## 2025-03-13 DIAGNOSIS — E66.01 CLASS 3 SEVERE OBESITY WITH BODY MASS INDEX (BMI) OF 40.0 TO 44.9 IN ADULT, UNSPECIFIED OBESITY TYPE, UNSPECIFIED WHETHER SERIOUS COMORBIDITY PRESENT: ICD-10-CM

## 2025-03-13 DIAGNOSIS — T78.40XA ALLERGY, INITIAL ENCOUNTER: ICD-10-CM

## 2025-03-13 DIAGNOSIS — L85.3 XEROSIS CUTIS: ICD-10-CM

## 2025-03-13 DIAGNOSIS — E79.0 ELEVATED URIC ACID IN BLOOD: ICD-10-CM

## 2025-03-13 DIAGNOSIS — M54.50 CHRONIC MIDLINE LOW BACK PAIN: ICD-10-CM

## 2025-03-13 DIAGNOSIS — I10 ESSENTIAL HYPERTENSION: ICD-10-CM

## 2025-03-13 DIAGNOSIS — G89.29 CHRONIC MIDLINE LOW BACK PAIN: ICD-10-CM

## 2025-03-13 DIAGNOSIS — Z72.0 TOBACCO USE: ICD-10-CM

## 2025-03-13 DIAGNOSIS — G47.00 INSOMNIA, UNSPECIFIED TYPE: ICD-10-CM

## 2025-03-13 DIAGNOSIS — E66.813 CLASS 3 SEVERE OBESITY WITH BODY MASS INDEX (BMI) OF 40.0 TO 44.9 IN ADULT, UNSPECIFIED OBESITY TYPE, UNSPECIFIED WHETHER SERIOUS COMORBIDITY PRESENT: ICD-10-CM

## 2025-03-13 PROCEDURE — 3079F DIAST BP 80-89 MM HG: CPT | Mod: CPTII,S$GLB,, | Performed by: INTERNAL MEDICINE

## 2025-03-13 PROCEDURE — 3075F SYST BP GE 130 - 139MM HG: CPT | Mod: CPTII,S$GLB,, | Performed by: INTERNAL MEDICINE

## 2025-03-13 PROCEDURE — 3008F BODY MASS INDEX DOCD: CPT | Mod: CPTII,S$GLB,, | Performed by: INTERNAL MEDICINE

## 2025-03-13 PROCEDURE — 3044F HG A1C LEVEL LT 7.0%: CPT | Mod: CPTII,S$GLB,, | Performed by: INTERNAL MEDICINE

## 2025-03-13 PROCEDURE — 1159F MED LIST DOCD IN RCRD: CPT | Mod: CPTII,S$GLB,, | Performed by: INTERNAL MEDICINE

## 2025-03-13 PROCEDURE — 99999 PR PBB SHADOW E&M-EST. PATIENT-LVL IV: CPT | Mod: PBBFAC,,, | Performed by: INTERNAL MEDICINE

## 2025-03-13 PROCEDURE — 99213 OFFICE O/P EST LOW 20 MIN: CPT | Mod: S$GLB,,, | Performed by: INTERNAL MEDICINE

## 2025-03-13 PROCEDURE — 1160F RVW MEDS BY RX/DR IN RCRD: CPT | Mod: CPTII,S$GLB,, | Performed by: INTERNAL MEDICINE

## 2025-03-13 PROCEDURE — 4010F ACE/ARB THERAPY RXD/TAKEN: CPT | Mod: CPTII,S$GLB,, | Performed by: INTERNAL MEDICINE

## 2025-03-13 RX ORDER — TIZANIDINE 4 MG/1
4 TABLET ORAL 2 TIMES DAILY PRN
Qty: 60 TABLET | Refills: 1 | Status: SHIPPED | OUTPATIENT
Start: 2025-03-13

## 2025-03-13 RX ORDER — CLOTRIMAZOLE AND BETAMETHASONE DIPROPIONATE 10; .64 MG/G; MG/G
CREAM TOPICAL 2 TIMES DAILY
Qty: 45 G | Refills: 1 | Status: SHIPPED | OUTPATIENT
Start: 2025-03-13

## 2025-03-13 RX ORDER — POTASSIUM CHLORIDE 20 MEQ/1
20 TABLET, EXTENDED RELEASE ORAL 2 TIMES DAILY
Qty: 180 TABLET | Refills: 3 | Status: SHIPPED | OUTPATIENT
Start: 2025-03-13

## 2025-03-13 RX ORDER — CETIRIZINE HYDROCHLORIDE 10 MG/1
10 TABLET ORAL DAILY
Qty: 90 TABLET | Refills: 0 | Status: SHIPPED | OUTPATIENT
Start: 2025-03-13

## 2025-03-13 RX ORDER — TRIAMCINOLONE ACETONIDE 1 MG/G
CREAM TOPICAL
Qty: 30 G | Refills: 2 | Status: SHIPPED | OUTPATIENT
Start: 2025-03-13

## 2025-03-13 RX ORDER — HYDROCHLOROTHIAZIDE 25 MG/1
25 TABLET ORAL DAILY
Qty: 90 TABLET | Refills: 1 | Status: SHIPPED | OUTPATIENT
Start: 2025-03-13

## 2025-03-13 RX ORDER — LOSARTAN POTASSIUM 50 MG/1
50 TABLET ORAL DAILY
Qty: 90 TABLET | Refills: 2 | Status: SHIPPED | OUTPATIENT
Start: 2025-03-13

## 2025-03-13 RX ORDER — ZOLPIDEM TARTRATE 5 MG/1
5 TABLET ORAL
Qty: 30 TABLET | Refills: 2 | Status: SHIPPED | OUTPATIENT
Start: 2025-03-13

## 2025-03-13 NOTE — LETTER
March 13, 2025      [facility] [address]  [place] [address]  Phone: [phone]  Fax: [phone]       Patient: Lulú Rosenthal   YOB: 1960  Date of Visit: 03/13/2025    To Whom It May Concern:    Ruddy Rosenthal  was at [facility] on 03/13/2025. The patient may return to work/school on 03/17/2025 with no restrictions. If you have any questions or concerns, or if I can be of further assistance, please do not hesitate to contact me.    Sincerely,    Kisha Rodriguez LPN

## 2025-03-13 NOTE — PROGRESS NOTES
Subjective:       Patient ID: Lulú Rosenthal is a 64 y.o. female.    Chief Complaint: Annual Exam and Medication Refill    HPI  History of Present Illness    CHIEF COMPLAINT:  Lulú presents today for an annual routine visit.    SKIN CONCERNS:  She reports experiencing back itching for several months associated with hyperpigmented, brownish spots. She was prescribed a topical cream at urgent care in February, which she has been using for approximately one month but reports difficulty with application to the affected area.    DIET AND EXERCISE:  She reports increased shrimp consumption, eating it nearly weekly since December. She acknowledges increased carbohydrate intake due to multiple family events. She plans to improve her diet by eating more vegetables and increasing water intake. She also plans to resume walking for exercise.    URINARY SYMPTOMS:  She reports clear appearing urine with increased water intake, though  observed yellow coloration.      ROS:  General: -fever, -chills, -fatigue, -weight gain, -weight loss  Eyes: -vision changes, -redness, -discharge  ENT: -ear pain, -nasal congestion, -sore throat  Cardiovascular: -chest pain, -palpitations, -lower extremity edema  Respiratory: -cough, -shortness of breath  Gastrointestinal: -abdominal pain, -nausea, -vomiting, -diarrhea, -constipation, -blood in stool  Genitourinary: -dysuria, -hematuria, -frequency  Musculoskeletal: -joint pain, -muscle pain  Skin: -rash, -lesion, +itching  Neurological: -headache, -dizziness, -numbness, -tingling  Psychiatric: -anxiety, -depression, -sleep difficulty       Review of Systems    Objective:      Physical Exam  Physical Exam    General: No acute distress. Well-developed. Well-nourished.  Eyes: EOMI. Sclerae anicteric.  HENT: Normocephalic. Atraumatic. Nares patent. Moist oral mucosa.  Ears: Bilateral TMs clear. Bilateral EACs clear.  Cardiovascular: Regular rate. Regular rhythm. No murmurs. No rubs.  No gallops. Normal S1, S2.  Respiratory: Normal respiratory effort. Clear to auscultation bilaterally. No rales. No rhonchi. No wheezing.  Abdomen: Soft. Non-tender. Non-distended. Normoactive bowel sounds.  Musculoskeletal: No  obvious deformity.  Extremities: No lower extremity edema.  Neurological: Alert & oriented x3. No slurred speech. Normal gait.  Psychiatric: Normal mood. Normal affect. Good insight. Good judgment.  Skin: Warm. Dry. No rash.           Assessment:       1. Tinea versicolor    2. Insomnia, unspecified type    3. Xerosis cutis    4. Chronic midline low back pain    5. Essential hypertension    6. Allergy, initial encounter    7. Elevated uric acid in blood    8. Class 3 severe obesity with body mass index (BMI) of 40.0 to 44.9 in adult, unspecified obesity type, unspecified whether serious comorbidity present    9. Tobacco use        Plan:       Tinea versicolor  -     clotrimazole-betamethasone 1-0.05% (LOTRISONE) cream; Apply topically 2 (two) times daily.  Dispense: 45 g; Refill: 1    Insomnia, unspecified type  -     zolpidem (AMBIEN) 5 MG Tab; Take 1 tablet (5 mg total) by mouth as needed (insomnia).  Dispense: 30 tablet; Refill: 2    Xerosis cutis  -     triamcinolone acetonide 0.1% (KENALOG) 0.1 % cream; Apply twice daily as needed for itching or rash of skin, maximum 2 weeks per month  Dispense: 30 g; Refill: 2    Chronic midline low back pain  Comments:  continue with local tx and medications prn  Orders:  -     tiZANidine (ZANAFLEX) 4 MG tablet; Take 1 tablet (4 mg total) by mouth 2 (two) times daily as needed (muscl spasm).  Dispense: 60 tablet; Refill: 1    Essential hypertension  Comments:  fairly controlled  Orders:  -     losartan (COZAAR) 50 MG tablet; Take 1 tablet (50 mg total) by mouth once daily.  Dispense: 90 tablet; Refill: 2  -     hydroCHLOROthiazide (HYDRODIURIL) 25 MG tablet; Take 1 tablet (25 mg total) by mouth once daily.  Dispense: 90 tablet; Refill: 1  -     CBC  Auto Differential; Future; Expected date: 03/13/2025  -     Urinalysis; Future; Expected date: 03/13/2025    Allergy, initial encounter  -     cetirizine (ZYRTEC) 10 MG tablet; Take 1 tablet (10 mg total) by mouth once daily.  Dispense: 90 tablet; Refill: 0    Elevated uric acid in blood  -     Uric Acid; Future; Expected date: 03/13/2025    Class 3 severe obesity with body mass index (BMI) of 40.0 to 44.9 in adult, unspecified obesity type, unspecified whether serious comorbidity present  Comments:  pt try loose wt with diet and exercise will monitor    Tobacco use  Comments:  pt only smoe on and off not everyday 1/4 ppd discuss smoking cessation    Other orders  -     potassium chloride SA (K-DUR,KLOR-CON) 20 MEQ tablet; Take 1 tablet (20 mEq total) by mouth 2 (two) times daily.  Dispense: 180 tablet; Refill: 3      Assessment & Plan    IMPRESSION:  - Assessed back itching, suspecting fungal infection due to hyperpigmented, irritating spots  - Reviewed urine test results: dipstick showed possible few RBCs, microscopic analysis revealed normal findings (1 RBC)  - Evaluated lab results: A1c, electrolytes, kidney and liver function tests all within normal range  - Noted elevated uric acid levels, likely due to dietary factors (shellfish consumption) rather than gout, as patient is asymptomatic    OBESITY, CLASS 3:  - Measured patient's BMI (40.0-44.9), indicating class 3 obesity.  - Discussed the need for lifestyle changes, acknowledging patient's excessive eating due to family circumstances.  - Recommend dietary modifications, including reduced carbohydrate intake, increased water consumption, and resumption of healthier eating habits.  - Encouraged increased physical activity to support weight management efforts.    ATHEROSCLEROSIS OF NATIVE ARTERIES OF EXTREMITIES WITH REST PAIN, RIGHT LEG:  - Lulú denies chest pain or mobility issues.  - Evaluated cholesterol levels, which are favorable with low LDL and high  HDL.  - Encouraged continuation of current diet and lifestyle habits that have contributed to these positive cholesterol results.    FUNGAL SKIN INFECTION:  - Examined patient's back, observing a few spots indicative of a fungal infection with hyperpigmentation and irritation.  - Prescribed a new antifungal cream for persistent back itching, as previous cream from urgent care used for 1 month was ineffective.  - Instructed patient to follow up if itching persists after 7-10 days of new cream use.  - Educated patient about fungal skin infections and their appearance.    HYPERURICEMIA:  - Confirmed high uric acid levels in blood test results.  - Explained urine test results, including dipstick test sensitivity and normalcy of occasional RBCs.  - Discussed the relationship between shellfish consumption and elevated uric acid levels, educating patient on gout symptoms and its genetic predisposition.  - Recommend following a low uric acid diet, specifically reducing shellfish (particularly shrimp) intake.  - Ordered a uric acid blood test to be done in 3-4 months.    GENERAL HEALTH RECOMMENDATIONS:  - Lulú to maintain current fish consumption for omega-3 benefits.    FOLLOW-UP:  - Scheduled a follow-up visit in 3-4 months to review blood test results for uric acid levels.  - Option for virtual follow-up available.           Medication List with Changes/Refills   New Medications    CLOTRIMAZOLE-BETAMETHASONE 1-0.05% (LOTRISONE) CREAM    Apply topically 2 (two) times daily.   Changed and/or Refilled Medications    Modified Medication Previous Medication    CETIRIZINE (ZYRTEC) 10 MG TABLET cetirizine (ZYRTEC) 10 MG tablet       Take 1 tablet (10 mg total) by mouth once daily.    Take 1 tablet (10 mg total) by mouth once daily.    HYDROCHLOROTHIAZIDE (HYDRODIURIL) 25 MG TABLET hydroCHLOROthiazide (HYDRODIURIL) 25 MG tablet       Take 1 tablet (25 mg total) by mouth once daily.    Take 1 tablet (25 mg total) by mouth once  daily.    LOSARTAN (COZAAR) 50 MG TABLET losartan (COZAAR) 50 MG tablet       Take 1 tablet (50 mg total) by mouth once daily.    Take 1 tablet (50 mg total) by mouth once daily.    POTASSIUM CHLORIDE SA (K-DUR,KLOR-CON) 20 MEQ TABLET potassium chloride SA (K-DUR,KLOR-CON) 20 MEQ tablet       Take 1 tablet (20 mEq total) by mouth 2 (two) times daily.    TAKE 1 TABLET(20 MEQ) BY MOUTH TWICE DAILY    TIZANIDINE (ZANAFLEX) 4 MG TABLET tiZANidine (ZANAFLEX) 4 MG tablet       Take 1 tablet (4 mg total) by mouth 2 (two) times daily as needed (muscl spasm).    Take 1 tablet (4 mg total) by mouth 2 (two) times daily as needed (muscl spasm).    TRIAMCINOLONE ACETONIDE 0.1% (KENALOG) 0.1 % CREAM triamcinolone acetonide 0.1% (KENALOG) 0.1 % cream       Apply twice daily as needed for itching or rash of skin, maximum 2 weeks per month    Apply twice daily as needed for itching or rash of skin, maximum 2 weeks per month    ZOLPIDEM (AMBIEN) 5 MG TAB zolpidem (AMBIEN) 5 MG Tab       Take 1 tablet (5 mg total) by mouth as needed (insomnia).    Take 1 tablet (5 mg total) by mouth as needed (insomnia).        This note was generated with the assistance of ambient listening technology. Verbal consent was obtained by the patient and accompanying visitor(s) for the recording of patient appointment to facilitate this note. I attest to having reviewed and edited the generated note for accuracy, though some syntax or spelling errors may persist. Please contact the author of this note for any clarification.

## 2025-03-17 PROBLEM — E66.813 CLASS 3 SEVERE OBESITY WITH BODY MASS INDEX (BMI) OF 40.0 TO 44.9 IN ADULT, UNSPECIFIED OBESITY TYPE, UNSPECIFIED WHETHER SERIOUS COMORBIDITY PRESENT: Status: ACTIVE | Noted: 2025-03-17

## 2025-03-17 PROBLEM — E66.01 CLASS 3 SEVERE OBESITY WITH BODY MASS INDEX (BMI) OF 40.0 TO 44.9 IN ADULT, UNSPECIFIED OBESITY TYPE, UNSPECIFIED WHETHER SERIOUS COMORBIDITY PRESENT: Status: ACTIVE | Noted: 2025-03-17

## 2025-06-18 DIAGNOSIS — G89.29 CHRONIC MIDLINE LOW BACK PAIN: ICD-10-CM

## 2025-06-18 DIAGNOSIS — M54.50 CHRONIC MIDLINE LOW BACK PAIN: ICD-10-CM

## 2025-06-18 DIAGNOSIS — T78.40XA ALLERGY, INITIAL ENCOUNTER: ICD-10-CM

## 2025-06-18 RX ORDER — CETIRIZINE HYDROCHLORIDE 10 MG/1
10 TABLET ORAL DAILY
Qty: 90 TABLET | Refills: 0 | Status: SHIPPED | OUTPATIENT
Start: 2025-06-18

## 2025-06-18 RX ORDER — TIZANIDINE 4 MG/1
4 TABLET ORAL 2 TIMES DAILY PRN
Qty: 60 TABLET | Refills: 1 | Status: SHIPPED | OUTPATIENT
Start: 2025-06-18

## 2025-06-18 NOTE — TELEPHONE ENCOUNTER
No care due was identified.  [facility] Embedded Care Due Messages. Reference number: [id number].   6/18/2025 10:03:57 AM CDT

## 2025-07-17 ENCOUNTER — OFFICE VISIT (OUTPATIENT)
Dept: PRIMARY CARE CLINIC | Facility: CLINIC | Age: 65
End: 2025-07-17
Payer: COMMERCIAL

## 2025-07-17 VITALS
TEMPERATURE: 98 F | RESPIRATION RATE: 18 BRPM | HEIGHT: 63 IN | WEIGHT: 223.13 LBS | SYSTOLIC BLOOD PRESSURE: 136 MMHG | DIASTOLIC BLOOD PRESSURE: 82 MMHG | HEART RATE: 86 BPM | BODY MASS INDEX: 39.54 KG/M2 | OXYGEN SATURATION: 97 %

## 2025-07-17 DIAGNOSIS — R21 RASH: Primary | ICD-10-CM

## 2025-07-17 PROCEDURE — 99999 PR PBB SHADOW E&M-EST. PATIENT-LVL IV: CPT | Mod: PBBFAC,,,

## 2025-07-17 RX ORDER — HYDROXYZINE HYDROCHLORIDE 25 MG/1
25 TABLET, FILM COATED ORAL 3 TIMES DAILY
Qty: 30 TABLET | Refills: 0 | Status: SHIPPED | OUTPATIENT
Start: 2025-07-17

## 2025-07-17 RX ORDER — PREDNISONE 20 MG/1
20 TABLET ORAL DAILY
Qty: 5 TABLET | Refills: 0 | Status: SHIPPED | OUTPATIENT
Start: 2025-07-17

## 2025-07-17 NOTE — PROGRESS NOTES
Assessment:       1. Rash       Plan:       Rash  -     hydrOXYzine HCL (ATARAX) 25 MG tablet; Take 1 tablet (25 mg total) by mouth 3 (three) times daily.  Dispense: 30 tablet; Refill: 0  -     predniSONE (DELTASONE) 20 MG tablet; Take 1 tablet (20 mg total) by mouth once daily.  Dispense: 5 tablet; Refill: 0    Assessment & Plan    - Assessed complaint of itching and redness, likely due to some form of allergic reaction or hives.  - Determined oral antihistamine and short course of steroids appropriate to manage symptoms and reduce inflammation.  - Discontinued topical treatments as they were ineffective.    DERMATITIS:  - Ms. Rosenthal reports progressive itching and redness initially noticed 1.5 years ago, now affecting neck and chest.  - Examination suggests a reaction, possibly hives or heat rash, with internal itching sensation.  - No changes in detergent or soap noted.  - Prescribed hydroxyzine up to 3 times daily as needed (may cause drowsiness) and prednisone daily for 5 days to reduce inflammation.  - Ms. Rosenthal may continue applying cream if desired, but oral medication is preferred for treatment.    FOLLOW-UP:  - Instructed patient to contact the office if symptoms do not improve with prescribed medications.           Subjective:    Patient ID: Lulú Rosenthal is a 64 y.o. female.  Chief Complaint: Rash (X3 days )    HPI  History of Present Illness    Ms. Rosenthal presents with itching and a rash on her chest and neck.  Ms. Rosenthal reports itching and a rash that initially started 1.5 years ago in one spot on her chest. The symptoms have recently spread to the middle of her chest and are now moving up her neck. The current exacerbation began 2-3 days ago, with today being the third day. She initially ignored it, thinking it was nighttime itching. Two nights ago, she felt hot while sleeping, removed her covers, and noticed the rash spreading up her neck. She describes it as feeling like a rash and  "wonders if it could be a heat rash. The itching is extremely bothersome, affecting the back of her neck and chest. She reports feeling the sensation internally. She has tried using topical medications, including Lotrisone, but states they are ineffective.  She denies any changes in detergent or soap use, coming into contact with any unusual substances, or being diabetic.      Review of Systems   Constitutional:  Negative for appetite change, fatigue, fever and unexpected weight change.   HENT:  Negative for hearing loss and sore throat.    Eyes:  Negative for pain and visual disturbance.   Respiratory:  Negative for cough, shortness of breath and wheezing.    Cardiovascular:  Negative for chest pain, palpitations and leg swelling.   Gastrointestinal:  Negative for abdominal pain, blood in stool, constipation, diarrhea, nausea and vomiting.   Genitourinary:  Negative for dysuria.   Musculoskeletal:  Negative for arthralgias.   Skin:  Positive for rash (heat rash to neck area).   Neurological:  Negative for dizziness and headaches.   Psychiatric/Behavioral:  Negative for suicidal ideas.        Objective:      Vitals:    07/17/25 1115   BP: 136/82   BP Location: Left arm   Patient Position: Sitting   Pulse: 86   Resp: 18   Temp: 98.3 °F (36.8 °C)   TempSrc: Oral   SpO2: 97%   Weight: 101.2 kg (223 lb 1.7 oz)   Height: 5' 3" (1.6 m)     BP Readings from Last 5 Encounters:   07/17/25 136/82   03/13/25 134/88   02/02/25 (!) 142/67   10/31/24 (!) 145/82   09/06/24 120/72     Wt Readings from Last 5 Encounters:   07/17/25 101.2 kg (223 lb 1.7 oz)   03/13/25 102.6 kg (226 lb 3.1 oz)   02/02/25 99.8 kg (220 lb)   09/06/24 101 kg (222 lb 10.6 oz)   01/18/24 96.5 kg (212 lb 11.9 oz)     Physical Exam  Vitals and nursing note reviewed.   Constitutional:       General: She is not in acute distress.  HENT:      Head: Atraumatic.   Cardiovascular:      Rate and Rhythm: Normal rate and regular rhythm.      Heart sounds: No murmur " heard.  Pulmonary:      Effort: Pulmonary effort is normal. No respiratory distress.      Breath sounds: Normal breath sounds. No wheezing, rhonchi or rales.   Musculoskeletal:         General: Normal range of motion.      Right lower leg: No edema.      Left lower leg: No edema.   Skin:     Findings: Rash (upper neck region) present.   Neurological:      Mental Status: She is alert and oriented to person, place, and time.      Gait: Gait normal.   Psychiatric:         Mood and Affect: Mood normal.         Thought Content: Thought content normal.           Lab Results   Component Value Date    WBC 3.74 (L) 03/08/2025    HGB 13.4 03/08/2025    HCT 42.3 03/08/2025     03/08/2025    CHOL 151 03/08/2025    TRIG 88 03/08/2025    HDL 55 03/08/2025    ALT 33 03/08/2025    AST 30 03/08/2025     03/08/2025    K 4.5 03/08/2025     03/08/2025    CREATININE 0.7 03/08/2025    BUN 21 03/08/2025    CO2 24 03/08/2025    TSH 1.443 01/16/2024    HGBA1C 5.6 03/08/2025      This note was generated with the assistance of ambient listening technology. Verbal consent was obtained by the patient and accompanying visitor(s) for the recording of patient appointment to facilitate this note. I attest to having reviewed and edited the generated note for accuracy, though some syntax or spelling errors may persist. Please contact the author of this note for any clarification.    BRAVO Irizarry, JUANC

## 2025-07-17 NOTE — LETTER
July 17, 2025      [facility] [address]  [address]  [place] [address]  Phone: [phone]  Fax: [phone]       Patient: Lulú Rosenthal   YOB: 1960  Date of Visit: 07/17/2025    To Whom It May Concern:    Ruddy Rosenthal  was at [facility] on 07/17/2025. The patient may return to work/school on 07/21/2025 with no restrictions. If you have any questions or concerns, or if I can be of further assistance, please do not hesitate to contact me.    Sincerely,          Ariel Iverson MA